# Patient Record
Sex: FEMALE | Race: WHITE | NOT HISPANIC OR LATINO | Employment: UNEMPLOYED | URBAN - NONMETROPOLITAN AREA
[De-identification: names, ages, dates, MRNs, and addresses within clinical notes are randomized per-mention and may not be internally consistent; named-entity substitution may affect disease eponyms.]

---

## 2017-01-24 ENCOUNTER — NON-PROVIDER VISIT (OUTPATIENT)
Dept: MEDICAL GROUP | Facility: CLINIC | Age: 62
End: 2017-01-24
Payer: MEDICAID

## 2017-01-24 DIAGNOSIS — Z02.1 PRE-EMPLOYMENT DRUG SCREENING: ICD-10-CM

## 2017-01-24 PROCEDURE — 86580 TB INTRADERMAL TEST: CPT | Performed by: NURSE PRACTITIONER

## 2017-01-26 ENCOUNTER — NON-PROVIDER VISIT (OUTPATIENT)
Dept: MEDICAL GROUP | Facility: CLINIC | Age: 62
End: 2017-01-26

## 2017-01-26 DIAGNOSIS — Z11.1 ENCOUNTER FOR PPD SKIN TEST READING: ICD-10-CM

## 2017-01-26 LAB — TB WHEAL 3D P 5 TU DIAM: NORMAL MM

## 2017-12-05 ENCOUNTER — NON-PROVIDER VISIT (OUTPATIENT)
Dept: MEDICAL GROUP | Facility: CLINIC | Age: 62
End: 2017-12-05

## 2017-12-05 DIAGNOSIS — Z11.1 PPD SCREENING TEST: ICD-10-CM

## 2017-12-05 PROCEDURE — 86580 TB INTRADERMAL TEST: CPT | Performed by: PHYSICIAN ASSISTANT

## 2017-12-07 ENCOUNTER — APPOINTMENT (OUTPATIENT)
Dept: MEDICAL GROUP | Facility: CLINIC | Age: 62
End: 2017-12-07

## 2017-12-07 LAB — TB WHEAL 3D P 5 TU DIAM: NORMAL MM

## 2018-12-27 ENCOUNTER — OFFICE VISIT (OUTPATIENT)
Dept: MEDICAL GROUP | Facility: CLINIC | Age: 63
End: 2018-12-27
Payer: MEDICAID

## 2018-12-27 ENCOUNTER — HOSPITAL ENCOUNTER (OUTPATIENT)
Facility: MEDICAL CENTER | Age: 63
End: 2018-12-27
Attending: FAMILY MEDICINE
Payer: MEDICAID

## 2018-12-27 VITALS
TEMPERATURE: 98.1 F | HEIGHT: 64 IN | HEART RATE: 68 BPM | SYSTOLIC BLOOD PRESSURE: 126 MMHG | DIASTOLIC BLOOD PRESSURE: 74 MMHG | BODY MASS INDEX: 49 KG/M2 | RESPIRATION RATE: 16 BRPM | OXYGEN SATURATION: 97 % | WEIGHT: 287 LBS

## 2018-12-27 DIAGNOSIS — H91.93 BILATERAL HEARING LOSS, UNSPECIFIED HEARING LOSS TYPE: ICD-10-CM

## 2018-12-27 DIAGNOSIS — E03.9 HYPOTHYROIDISM, UNSPECIFIED TYPE: ICD-10-CM

## 2018-12-27 DIAGNOSIS — E11.9 TYPE 2 DIABETES MELLITUS WITHOUT COMPLICATION, WITHOUT LONG-TERM CURRENT USE OF INSULIN (HCC): ICD-10-CM

## 2018-12-27 LAB
FORWARD REASON: SPWHY: NORMAL
FORWARDED TO LAB: SPWHR: NORMAL
SPECIMEN SENT: SPWT1: NORMAL

## 2018-12-27 PROCEDURE — 99213 OFFICE O/P EST LOW 20 MIN: CPT | Performed by: FAMILY MEDICINE

## 2018-12-27 ASSESSMENT — PATIENT HEALTH QUESTIONNAIRE - PHQ9: CLINICAL INTERPRETATION OF PHQ2 SCORE: 0

## 2018-12-27 ASSESSMENT — PAIN SCALES - GENERAL: PAINLEVEL: NO PAIN

## 2018-12-27 NOTE — ASSESSMENT & PLAN NOTE
Needs referral to Dr. Paez in [place]. Has appointment this afternoon. Lost right hearing aid, left doesn't work very well.

## 2018-12-27 NOTE — PROGRESS NOTES
Complaint: Referral to audiologist in [place]     Subjective:     Poonam Alonzo is a 63 y.o. female here today requesting a referral. Has not had insurance in 2 years. Requesting PE.     [place] (hard of hearing)  Needs referral to Dr. Paez in [place]. Has appointment this afternoon. Lost right hearing aid, left doesn't work very well.     States she has lost 10 lbs. Exercising and watching her diet. Taking otc thyroid supplement.     Current medicines (including changes today)  No current outpatient prescriptions on file.     No current facility-administered medications for this visit.      She  has a past medical history of Diabetes mellitus (HCC) (3/18/2015); [place] (hard of hearing); and Hypothyroid (6/26/2014). She also has no past medical history of Diabetes.    Health Maintenance: refuses all health maintenance today      Allergies: Codeine and Sulfa drugs    No current Epic-ordered outpatient prescriptions on file.     No current Epic-ordered facility-administered medications on file.        Past Medical History:   Diagnosis Date   • Diabetes mellitus (HCC) 3/18/2015   • [place] (hard of hearing)    • Hypothyroid 6/26/2014       Past Surgical History:   Procedure Laterality Date   • ABDOMINAL HYSTERECTOMY TOTAL  9/29/2014   • ABDOMINAL EXPLORATION      laparoscopic hysterectomy       Social History   Substance Use Topics   • Smoking status: Never Smoker   • Smokeless tobacco: Never Used   • Alcohol use No       Social History     Social History Narrative   • No narrative on file       No family history on file.      ROS Positive for difficulty hearing.  Patient denies any fever, chills, unintentional weight gain/loss, fatigue, stroke symptoms, dizziness, headache, nasal congestion, sore-throat, cough, heartburn, chest pain, difficulty breathing, abdominal discomfort, diarrhea/constipation, burning with urination or frequency, joint or back pain, skin rashes, depression or anxiety.       Objective:     Blood pressure  "126/74, pulse 68, temperature 36.7 °C (98.1 °F), temperature source Temporal, resp. rate 16, height 1.626 m (5' 4\"), weight (!) 130.2 kg (287 lb), SpO2 97 %, not currently breastfeeding. Body mass index is 49.26 kg/m².   Physical Exam:  Constitutional: Alert, no distress.  ENMT: Lips without lesions, good dentition, oropharynx clear. TM's pearly.  Neck: Trachea midline, no masses, no thyromegaly. No cervical or supraclavicular lymphadenopathy  Respiratory: Unlabored respiratory effort, lungs clear to auscultation, no wheezes, no ronchi.  Cardiovascular: Normal S1, S2, 2/6 systolic murmur, no extremity edema.  Psych: Alert and oriented x3, appropriate affect and mood.        Assessment and Plan:   The following treatment plan was discussed    1. Bilateral hearing loss, unspecified hearing loss type  Chronic problem.  - REFERRAL TO AUDIOLOGY    2. Labs ordered  - TSH WITH REFLEX TO FT4; Future  - CBC WITH DIFFERENTIAL; Future  - COMP METABOLIC PANEL; Future  - HEMOGLOBIN A1C; Future  - Lipid Profile; Future      Followup: Return in about 1 week (around 1/3/2019), or review labs, PE.    Please note that this dictation was created using voice recognition software. I have made every reasonable attempt to correct obvious errors, but I expect that there are errors of grammar and possibly content that I did not discover before finalizing the note.           "

## 2019-01-16 ENCOUNTER — OFFICE VISIT (OUTPATIENT)
Dept: MEDICAL GROUP | Facility: CLINIC | Age: 64
End: 2019-01-16
Payer: MEDICAID

## 2019-01-16 VITALS
HEART RATE: 60 BPM | BODY MASS INDEX: 47.29 KG/M2 | TEMPERATURE: 98.6 F | HEIGHT: 64 IN | OXYGEN SATURATION: 98 % | DIASTOLIC BLOOD PRESSURE: 66 MMHG | WEIGHT: 277 LBS | SYSTOLIC BLOOD PRESSURE: 112 MMHG

## 2019-01-16 DIAGNOSIS — L91.8 SKIN TAGS, MULTIPLE ACQUIRED: ICD-10-CM

## 2019-01-16 DIAGNOSIS — E03.9 HYPOTHYROIDISM, UNSPECIFIED TYPE: ICD-10-CM

## 2019-01-16 DIAGNOSIS — E78.2 MIXED HYPERLIPIDEMIA: ICD-10-CM

## 2019-01-16 PROCEDURE — 99214 OFFICE O/P EST MOD 30 MIN: CPT | Performed by: FAMILY MEDICINE

## 2019-01-16 RX ORDER — M-VIT,TX,IRON,MINS/CALC/FOLIC 27MG-0.4MG
1 TABLET ORAL DAILY
COMMUNITY
End: 2020-06-19

## 2019-01-16 ASSESSMENT — PATIENT HEALTH QUESTIONNAIRE - PHQ9: CLINICAL INTERPRETATION OF PHQ2 SCORE: 0

## 2019-01-16 NOTE — PROGRESS NOTES
Complaint: F/u labs     Subjective:     Poonam Alonzo is a 63 y.o. female here today for f/u labs.    Hypothyroid  Latest TSH 7.8, fT4 1.2. Taking natural thyroid extract.    Skin lesions  Has some growths on her back and in left arm pit.    Hyperlipidemia  Latest FLP: Tchol 244, HDL 35, , .     Would also like to know if she would be a good candidate for foster-mom. 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 In addition, adolescent would be coming from [place], has severe behavioral and mental issues. I told her that I did not think that would be a good idea, and that [place] did not have the appropriate mental health services for an adolescent with those issues. He would be too much of a burden for her at her age.    Current medicines (including changes today)  Current Outpatient Prescriptions   Medication Sig Dispense Refill   • THYROID PO Take  by mouth.     • therapeutic multivitamin-minerals (THERAGRAN-M) Tab Take 1 Tab by mouth every day.       No current facility-administered medications for this visit.      She  has a past medical history of Diabetes mellitus (HCC) (3/18/2015); [place] (hard of hearing); and Hypothyroid (6/26/2014). She also has no past medical history of Diabetes.    Health Maintenance:       Allergies: Codeine and Sulfa drugs    Current Outpatient Prescriptions Ordered in [facility]   Medication Sig Dispense Refill   • THYROID PO Take  by mouth.     • therapeutic multivitamin-minerals (THERAGRAN-M) Tab Take 1 Tab by mouth every day.       No current [facility]-ordered facility-administered medications on file.        Past Medical History:   Diagnosis Date   • Diabetes mellitus (HCC) 3/18/2015   • [place] (hard of hearing)    • Hypothyroid 6/26/2014       Past Surgical History:   Procedure Laterality Date   • ABDOMINAL HYSTERECTOMY TOTAL  9/29/2014   • ABDOMINAL EXPLORATION      laparoscopic hysterectomy       Social History  "  Substance Use Topics   • Smoking status: Never Smoker   • Smokeless tobacco: Never Used   • Alcohol use No       Social History     Social History Narrative   • No narrative on file       History reviewed. No pertinent family history.      ROS   Patient denies any fever, chills, unintentional weight gain/loss, fatigue, stroke symptoms, dizziness, headache, nasal congestion, sore-throat, cough, heartburn, chest pain, difficulty breathing, abdominal discomfort, diarrhea/constipation, burning with urination or frequency, joint or back pain, skin rashes, depression or anxiety.       Objective:     Blood pressure 112/66, pulse 60, temperature 37 °C (98.6 °F), temperature source Temporal, height 1.626 m (5' 4\"), weight (!) 125.6 kg (277 lb), SpO2 98 %, not currently breastfeeding. Body mass index is 47.55 kg/m².   Physical Exam:  Constitutional: Alert, no distress.  Skin: Warm, dry, good turgor, no rashes in visible areas. Large pendulant tabs left lower back, left axilla. Numerous seborrheic keratoses.          Assessment and Plan:   The following treatment plan was discussed    1. Mixed hyperlipidemia  10 yr ASCVD score: 5.4%. No need for aggressive lowering. Did recommend taking Omega 3 and red yeast rice supplements.    2. Skin tags, multiple acquired  Fibromas, return to have removed in [place].    3. Hypothyroidism, unspecified type  Recommend doubling up on extract. Recheck 3 months. May need to be convinced to take rx levothyroxine.      Followup: Return in about 3 months (around 4/16/2019).    Please note that this dictation was created using voice recognition software. I have made every reasonable attempt to correct obvious errors, but I expect that there are errors of grammar and possibly content that I did not discover before finalizing the note.           "

## 2019-04-24 ENCOUNTER — OFFICE VISIT (OUTPATIENT)
Dept: MEDICAL GROUP | Facility: CLINIC | Age: 64
End: 2019-04-24
Payer: MEDICARE

## 2019-04-24 VITALS
TEMPERATURE: 99 F | HEART RATE: 88 BPM | RESPIRATION RATE: 14 BRPM | SYSTOLIC BLOOD PRESSURE: 126 MMHG | HEIGHT: 65 IN | BODY MASS INDEX: 46.15 KG/M2 | WEIGHT: 277 LBS | OXYGEN SATURATION: 95 % | DIASTOLIC BLOOD PRESSURE: 86 MMHG

## 2019-04-24 DIAGNOSIS — Z12.11 SCREENING FOR COLON CANCER: ICD-10-CM

## 2019-04-24 DIAGNOSIS — E66.01 CLASS 3 SEVERE OBESITY DUE TO EXCESS CALORIES WITHOUT SERIOUS COMORBIDITY WITH BODY MASS INDEX (BMI) OF 45.0 TO 49.9 IN ADULT (HCC): ICD-10-CM

## 2019-04-24 DIAGNOSIS — Z12.39 BREAST CANCER SCREENING: ICD-10-CM

## 2019-04-24 DIAGNOSIS — L98.9 SKIN LESIONS: ICD-10-CM

## 2019-04-24 DIAGNOSIS — E78.2 MIXED HYPERLIPIDEMIA: ICD-10-CM

## 2019-04-24 DIAGNOSIS — L91.8 CUTANEOUS SKIN TAGS: ICD-10-CM

## 2019-04-24 DIAGNOSIS — E03.9 HYPOTHYROIDISM, UNSPECIFIED TYPE: ICD-10-CM

## 2019-04-24 PROCEDURE — 11200 RMVL SKIN TAGS UP TO&INC 15: CPT | Mod: GZ | Performed by: FAMILY MEDICINE

## 2019-04-24 PROCEDURE — 99214 OFFICE O/P EST MOD 30 MIN: CPT | Mod: 25 | Performed by: FAMILY MEDICINE

## 2019-04-24 NOTE — PROGRESS NOTES
Complaint: REmoval skin growths, recheck labs     Subjective:     Poonam Alonzo is a 64 y.o. female here today for f/u.    Cutaneous skin tags  Would like to have a tag removed from her left arm pit and one from her left lower back.    Hypothyroid  Has doubled up on her otc thyroid med. Explained to her that she won't be able to lose weight or normalize her lipids until thyroid under control.    Hyperlipidemia  Has only been taking red yeast rice and Omega 3,     No other concerns or complaints.    Current medicines (including changes today)  Current Outpatient Prescriptions   Medication Sig Dispense Refill   • THYROID PO Take  by mouth.     • therapeutic multivitamin-minerals (THERAGRAN-M) Tab Take 1 Tab by mouth every day.       No current facility-administered medications for this visit.      She  has a past medical history of Diabetes mellitus (HCC) (3/18/2015); [place] (hard of hearing); and Hypothyroid (6/26/2014). She also has no past medical history of Diabetes.    Health Maintenance: needs mammogram, FIT (refuses colonoscopy).      Allergies: Codeine and Sulfa drugs    Current Outpatient Prescriptions Ordered in [facility]   Medication Sig Dispense Refill   • THYROID PO Take  by mouth.     • therapeutic multivitamin-minerals (THERAGRAN-M) Tab Take 1 Tab by mouth every day.       No current [facility]-ordered facility-administered medications on file.        Past Medical History:   Diagnosis Date   • Diabetes mellitus (HCC) 3/18/2015   • [place] (hard of hearing)    • Hypothyroid 6/26/2014       Past Surgical History:   Procedure Laterality Date   • ABDOMINAL HYSTERECTOMY TOTAL  9/29/2014   • ABDOMINAL EXPLORATION      laparoscopic hysterectomy       Social History   Substance Use Topics   • Smoking status: Never Smoker   • Smokeless tobacco: Never Used   • Alcohol use No       Social History     Social History Narrative   • No narrative on file       History reviewed. No pertinent family history.      ROS   Patient denies  "any fever, chills, unintentional weight gain/loss, fatigue, stroke symptoms, dizziness, headache, nasal congestion, sore-throat, cough, heartburn, chest pain, difficulty breathing, abdominal discomfort, diarrhea/constipation, burning with urination or frequency, joint or back pain, skin rashes, depression or anxiety.       Objective:     /86 (BP Location: Left arm, Patient Position: Sitting, BP Cuff Size: Large adult)   Pulse 88   Temp 37.2 °C (99 °F)   Resp 14   Ht 1.651 m (5' 5\")   Wt (!) 125.6 kg (277 lb)   SpO2 95%  Body mass index is 46.1 kg/m².   Physical Exam:  Constitutional: Alert, no distress.  Skin: left axilla - broad-based brown tag, about 1 cm across. Left lower back - large narrow based flesh colored tag.      Assessment and Plan:   The following treatment plan was discussed    1. Hypothyroidism, unspecified type  If still abnl. Will try to convince her to go on [place] thyroid or Synthroid/Levoxyl.  - TSH WITH REFLEX TO FT4; Future    2. Mixed hyperlipidemia  Will notify with result.  - Lipid Profile; Future    3. Breast cancer screening  - MA-SCREENING MAMMO BILAT W/TOMOSYNTHESIS W/CAD; Future    4. Cutaneous skin tags/Skin lesion    - Consent for Surgery / Procedure    Procedures:   Left axilla - skin disinfected with Betadine. LA using infiltration of 1% xylocaine plus epi. Growth excised at base using sharp scissors. Gapping wound close with 3 sutures 3.0 Ethilon with good approximation. Antibiotic ointment plus band-aid.  Left lower back: skin disinfected with Betadine. LA using 1% xylocaine plus epi. Excision using sharp scissors. Hyfrection base. Antibiotic ointment plus band-aid.    Tissue visibly benign. Discarded.    Usual wound care instructions reviewed. Suture removal in 1 week.      5. Class 3 severe obesity due to excess calories without serious comorbidity with body mass index (BMI) of 45.0 to 49.9 in adult (HCC)  Nees to start walking on a daily basis.    6. Screening for " colon cancer  - OCCULT BLOOD FECES IMMUNOASSAY (FIT); Future      Followup: Return in about 1 week (around 5/1/2019).    Please note that this dictation was created using voice recognition software. I have made every reasonable attempt to correct obvious errors, but I expect that there are errors of grammar and possibly content that I did not discover before finalizing the note.

## 2019-04-24 NOTE — ASSESSMENT & PLAN NOTE
Has doubled up on her otc thyroid med. Explained to her that she won't be able to lose weight or normalize her lipids until thyroid under control.

## 2019-05-01 ENCOUNTER — HOSPITAL ENCOUNTER (OUTPATIENT)
Facility: MEDICAL CENTER | Age: 64
End: 2019-05-01
Attending: FAMILY MEDICINE
Payer: MEDICARE

## 2019-05-01 ENCOUNTER — NON-PROVIDER VISIT (OUTPATIENT)
Dept: MEDICAL GROUP | Facility: CLINIC | Age: 64
End: 2019-05-01
Payer: MEDICARE

## 2019-05-01 DIAGNOSIS — E03.9 HYPOTHYROIDISM, UNSPECIFIED TYPE: ICD-10-CM

## 2019-05-01 DIAGNOSIS — E11.9 TYPE 2 DIABETES MELLITUS WITHOUT COMPLICATION, WITHOUT LONG-TERM CURRENT USE OF INSULIN (HCC): ICD-10-CM

## 2019-05-01 LAB
CHOLEST SERPL-MCNC: 242 MG/DL (ref 100–199)
HDLC SERPL-MCNC: 33 MG/DL
LDLC SERPL CALC-MCNC: 163 MG/DL
T4 FREE SERPL-MCNC: 0.84 NG/DL (ref 0.53–1.43)
TRIGL SERPL-MCNC: 230 MG/DL (ref 0–149)
TSH SERPL DL<=0.005 MIU/L-ACNC: 9.57 UIU/ML (ref 0.38–5.33)

## 2019-05-01 PROCEDURE — 82274 ASSAY TEST FOR BLOOD FECAL: CPT

## 2019-05-01 PROCEDURE — 99000 SPECIMEN HANDLING OFFICE-LAB: CPT | Performed by: FAMILY MEDICINE

## 2019-05-01 PROCEDURE — 84439 ASSAY OF FREE THYROXINE: CPT

## 2019-05-01 PROCEDURE — 80061 LIPID PANEL: CPT

## 2019-05-01 PROCEDURE — 36415 COLL VENOUS BLD VENIPUNCTURE: CPT | Performed by: FAMILY MEDICINE

## 2019-05-01 PROCEDURE — 84443 ASSAY THYROID STIM HORMONE: CPT

## 2019-05-03 ENCOUNTER — APPOINTMENT (OUTPATIENT)
Dept: MEDICAL GROUP | Facility: CLINIC | Age: 64
End: 2019-05-03
Payer: MEDICARE

## 2019-05-05 DIAGNOSIS — Z12.11 SCREENING FOR COLON CANCER: ICD-10-CM

## 2019-05-05 LAB — AMBIGUOUS DTTM AMBI4: NORMAL

## 2019-05-06 LAB — HEMOCCULT STL QL IA: NEGATIVE

## 2019-05-17 ENCOUNTER — OFFICE VISIT (OUTPATIENT)
Dept: MEDICAL GROUP | Facility: CLINIC | Age: 64
End: 2019-05-17
Payer: MEDICARE

## 2019-05-17 VITALS
WEIGHT: 279 LBS | RESPIRATION RATE: 14 BRPM | SYSTOLIC BLOOD PRESSURE: 126 MMHG | HEART RATE: 80 BPM | HEIGHT: 65 IN | DIASTOLIC BLOOD PRESSURE: 84 MMHG | OXYGEN SATURATION: 97 % | TEMPERATURE: 98.3 F | BODY MASS INDEX: 46.48 KG/M2

## 2019-05-17 DIAGNOSIS — E03.9 HYPOTHYROIDISM, UNSPECIFIED TYPE: ICD-10-CM

## 2019-05-17 DIAGNOSIS — H57.9 EYE PROBLEM: ICD-10-CM

## 2019-05-17 PROBLEM — R73.03 PREDIABETES: Status: ACTIVE | Noted: 2019-05-17

## 2019-05-17 PROCEDURE — 99213 OFFICE O/P EST LOW 20 MIN: CPT | Performed by: FAMILY MEDICINE

## 2019-05-17 RX ORDER — CHLORAL HYDRATE 500 MG
1000 CAPSULE ORAL
COMMUNITY
End: 2021-03-11

## 2019-05-17 RX ORDER — LEVOTHYROXINE SODIUM 0.07 MG/1
75 TABLET ORAL
Qty: 30 TAB | Refills: 5 | Status: SHIPPED | OUTPATIENT
Start: 2019-05-17 | End: 2019-11-12 | Stop reason: SDUPTHER

## 2019-05-17 NOTE — PROGRESS NOTES
Complaint: Eye drainage     Subjective:     Poonam Alonzo is a 64 y.o. female here today for a new issue.    Eye problem  Since this past weekend has been experiencing excessive tearing from left eye as well as seeing a dot in her sight. Denies any injury to eye. No sxs in left. Denies getting something in eye.    Hypothyroid  TSH came back 9.5. I convinced patient that her otc thyroid supplements were not working for her.     No other concerns or complaints today.    Current medicines (including changes today)  Current Outpatient Prescriptions   Medication Sig Dispense Refill   • Omega-3 Fatty Acids (FISH OIL) 1000 MG Cap capsule Take 1,000 mg by mouth 3 times a day, with meals.     • Multiple Vitamins-Minerals (HAIR SKIN AND NAILS FORMULA) Tab Take  by mouth.     • levothyroxine (SYNTHROID) 75 MCG Tab Take 1 Tab by mouth Every morning on an empty stomach. 30 Tab 5   • THYROID PO Take  by mouth.     • therapeutic multivitamin-minerals (THERAGRAN-M) Tab Take 1 Tab by mouth every day.       No current facility-administered medications for this visit.      She  has a past medical history of Diabetes mellitus (HCC) (3/18/2015); [place] (hard of hearing); and Hypothyroid (6/26/2014). She also has no past medical history of Diabetes.    Health Maintenance:       Allergies: Codeine and Sulfa drugs    Current Outpatient Prescriptions Ordered in [facility]   Medication Sig Dispense Refill   • Omega-3 Fatty Acids (FISH OIL) 1000 MG Cap capsule Take 1,000 mg by mouth 3 times a day, with meals.     • Multiple Vitamins-Minerals (HAIR SKIN AND NAILS FORMULA) Tab Take  by mouth.     • levothyroxine (SYNTHROID) 75 MCG Tab Take 1 Tab by mouth Every morning on an empty stomach. 30 Tab 5   • THYROID PO Take  by mouth.     • therapeutic multivitamin-minerals (THERAGRAN-M) Tab Take 1 Tab by mouth every day.       No current [facility]-ordered facility-administered medications on file.        Past Medical History:   Diagnosis Date   • Diabetes  "mellitus (HCC) 3/18/2015   • [place] (hard of hearing)    • Hypothyroid 6/26/2014       Past Surgical History:   Procedure Laterality Date   • ABDOMINAL HYSTERECTOMY TOTAL  9/29/2014   • ABDOMINAL EXPLORATION      laparoscopic hysterectomy       Social History   Substance Use Topics   • Smoking status: Never Smoker   • Smokeless tobacco: Never Used   • Alcohol use No       Social History     Social History Narrative   • No narrative on file       History reviewed. No pertinent family history.      ROS Positive for drainage from left eye, black spot moving in front of eye  Patient denies any fever, chills, unintentional weight gain/loss, fatigue, stroke symptoms, dizziness, headache, nasal congestion, sore-throat, cough, heartburn, chest pain, difficulty breathing, abdominal discomfort, diarrhea/constipation, burning with urination or frequency, joint or back pain, skin rashes, depression or anxiety.       Objective:     /84 (BP Location: Left arm, Patient Position: Sitting, BP Cuff Size: Large adult)   Pulse 80   Temp 36.8 °C (98.3 °F) (Temporal)   Resp 14   Ht 1.638 m (5' 4.5\")   Wt (!) 126.6 kg (279 lb)   SpO2 97%  Body mass index is 47.15 kg/m².   Physical Exam:  Constitutional: Alert, no distress.  Skin: Warm, dry, good turgor. Would left axilla healing well.  Eye: Tearing OS, Equal, round and reactive, conjunctiva clear, corneae intact,  lids normal. Normal fundi.        Assessment and Plan:   The following treatment plan was discussed    1. Hypothyroidism, unspecified type  Will start her on Synthroid. Recheck 3 months.  - levothyroxine (SYNTHROID) 75 MCG Tab; Take 1 Tab by mouth Every morning on an empty stomach.  Dispense: 30 Tab; Refill: 5    2. Eye problem  - REFERRAL TO OPHTHALMOLOGY- Patient given tel # Dr. [facility] practice in .      Followup: Return if symptoms worsen or fail to improve.    Please note that this dictation was created using voice recognition software. I have made every " reasonable attempt to correct obvious errors, but I expect that there are errors of grammar and possibly content that I did not discover before finalizing the note.

## 2019-05-17 NOTE — ASSESSMENT & PLAN NOTE
Since this past weekend has been experiencing excessive tearing from left eye as well as seeing a dot in her sight. Denies any injury to eye. No sxs in left. Denies getting something in eye.

## 2020-06-05 ENCOUNTER — HOSPITAL ENCOUNTER (OUTPATIENT)
Facility: MEDICAL CENTER | Age: 65
End: 2020-06-05
Attending: FAMILY MEDICINE
Payer: MEDICARE

## 2020-06-05 ENCOUNTER — OFFICE VISIT (OUTPATIENT)
Dept: MEDICAL GROUP | Facility: CLINIC | Age: 65
End: 2020-06-05
Payer: MEDICARE

## 2020-06-05 VITALS
RESPIRATION RATE: 16 BRPM | SYSTOLIC BLOOD PRESSURE: 124 MMHG | DIASTOLIC BLOOD PRESSURE: 72 MMHG | TEMPERATURE: 99.1 F | WEIGHT: 285 LBS | HEART RATE: 77 BPM | BODY MASS INDEX: 47.48 KG/M2 | OXYGEN SATURATION: 93 % | HEIGHT: 65 IN

## 2020-06-05 DIAGNOSIS — E78.2 MIXED HYPERLIPIDEMIA: ICD-10-CM

## 2020-06-05 DIAGNOSIS — Z12.12 SCREENING FOR COLORECTAL CANCER: ICD-10-CM

## 2020-06-05 DIAGNOSIS — R21 RASH: Chronic | ICD-10-CM

## 2020-06-05 DIAGNOSIS — E03.9 HYPOTHYROIDISM, UNSPECIFIED TYPE: ICD-10-CM

## 2020-06-05 DIAGNOSIS — Z12.11 SCREENING FOR COLORECTAL CANCER: ICD-10-CM

## 2020-06-05 DIAGNOSIS — J30.2 SEASONAL ALLERGIES: ICD-10-CM

## 2020-06-05 DIAGNOSIS — E66.01 CLASS 3 SEVERE OBESITY DUE TO EXCESS CALORIES WITHOUT SERIOUS COMORBIDITY WITH BODY MASS INDEX (BMI) OF 45.0 TO 49.9 IN ADULT (HCC): ICD-10-CM

## 2020-06-05 DIAGNOSIS — R73.03 PREDIABETES: ICD-10-CM

## 2020-06-05 PROBLEM — L91.8 CUTANEOUS SKIN TAGS: Status: RESOLVED | Noted: 2019-04-24 | Resolved: 2020-06-05

## 2020-06-05 LAB
ALBUMIN SERPL BCP-MCNC: 3.7 G/DL (ref 3.2–4.9)
ALBUMIN/GLOB SERPL: 1 G/DL
ALP SERPL-CCNC: 80 U/L (ref 30–99)
ALT SERPL-CCNC: 19 U/L (ref 2–50)
AMBIGUOUS DTTM AMBI4: NORMAL
ANION GAP SERPL CALC-SCNC: 15 MMOL/L (ref 7–16)
AST SERPL-CCNC: 19 U/L (ref 12–45)
BASOPHILS # BLD AUTO: 0.8 % (ref 0–1.8)
BASOPHILS # BLD: 0.07 K/UL (ref 0–0.12)
BILIRUB SERPL-MCNC: 0.2 MG/DL (ref 0.1–1.5)
BUN SERPL-MCNC: 19 MG/DL (ref 8–22)
CALCIUM SERPL-MCNC: 9.2 MG/DL (ref 8.5–10.5)
CHLORIDE SERPL-SCNC: 103 MMOL/L (ref 96–112)
CHOLEST SERPL-MCNC: 230 MG/DL (ref 100–199)
CO2 SERPL-SCNC: 20 MMOL/L (ref 20–33)
CREAT SERPL-MCNC: 0.8 MG/DL (ref 0.5–1.4)
EOSINOPHIL # BLD AUTO: 0.33 K/UL (ref 0–0.51)
EOSINOPHIL NFR BLD: 3.8 % (ref 0–6.9)
ERYTHROCYTE [DISTWIDTH] IN BLOOD BY AUTOMATED COUNT: 48.3 FL (ref 35.9–50)
EST. AVERAGE GLUCOSE BLD GHB EST-MCNC: 143 MG/DL
GLOBULIN SER CALC-MCNC: 3.6 G/DL (ref 1.9–3.5)
GLUCOSE SERPL-MCNC: 216 MG/DL (ref 65–99)
HBA1C MFR BLD: 6.6 % (ref 0–5.6)
HCT VFR BLD AUTO: 47 % (ref 37–47)
HDLC SERPL-MCNC: 31 MG/DL
HGB BLD-MCNC: 14.8 G/DL (ref 12–16)
IMM GRANULOCYTES # BLD AUTO: 0.02 K/UL (ref 0–0.11)
IMM GRANULOCYTES NFR BLD AUTO: 0.2 % (ref 0–0.9)
LDLC SERPL CALC-MCNC: 140 MG/DL
LYMPHOCYTES # BLD AUTO: 2.71 K/UL (ref 1–4.8)
LYMPHOCYTES NFR BLD: 30.9 % (ref 22–41)
MCH RBC QN AUTO: 27.7 PG (ref 27–33)
MCHC RBC AUTO-ENTMCNC: 31.5 G/DL (ref 33.6–35)
MCV RBC AUTO: 87.9 FL (ref 81.4–97.8)
MONOCYTES # BLD AUTO: 0.56 K/UL (ref 0–0.85)
MONOCYTES NFR BLD AUTO: 6.4 % (ref 0–13.4)
NEUTROPHILS # BLD AUTO: 5.09 K/UL (ref 2–7.15)
NEUTROPHILS NFR BLD: 57.9 % (ref 44–72)
NRBC # BLD AUTO: 0 K/UL
NRBC BLD-RTO: 0 /100 WBC
PLATELET # BLD AUTO: 256 K/UL (ref 164–446)
PMV BLD AUTO: 12.4 FL (ref 9–12.9)
POTASSIUM SERPL-SCNC: 4.1 MMOL/L (ref 3.6–5.5)
PROT SERPL-MCNC: 7.3 G/DL (ref 6–8.2)
RBC # BLD AUTO: 5.35 M/UL (ref 4.2–5.4)
SODIUM SERPL-SCNC: 138 MMOL/L (ref 135–145)
TRIGL SERPL-MCNC: 294 MG/DL (ref 0–149)
TSH SERPL DL<=0.005 MIU/L-ACNC: 3.84 UIU/ML (ref 0.38–5.33)
WBC # BLD AUTO: 8.8 K/UL (ref 4.8–10.8)

## 2020-06-05 PROCEDURE — 80053 COMPREHEN METABOLIC PANEL: CPT

## 2020-06-05 PROCEDURE — 99214 OFFICE O/P EST MOD 30 MIN: CPT | Performed by: FAMILY MEDICINE

## 2020-06-05 PROCEDURE — 85025 COMPLETE CBC W/AUTO DIFF WBC: CPT

## 2020-06-05 PROCEDURE — 84443 ASSAY THYROID STIM HORMONE: CPT

## 2020-06-05 PROCEDURE — 83036 HEMOGLOBIN GLYCOSYLATED A1C: CPT

## 2020-06-05 PROCEDURE — 80061 LIPID PANEL: CPT

## 2020-06-05 ASSESSMENT — PATIENT HEALTH QUESTIONNAIRE - PHQ9: CLINICAL INTERPRETATION OF PHQ2 SCORE: 0

## 2020-06-05 NOTE — ASSESSMENT & PLAN NOTE
Develops rash under breasts and under abdominal fold in hot months, self-treats with honey-based liquid.

## 2020-06-05 NOTE — ASSESSMENT & PLAN NOTE
Currently managed with Synthroid 75mcg/day, last TSH over 1 year ago! Some weight gain, no depression or other symptom of hypothyroidism.

## 2020-06-05 NOTE — PROGRESS NOTES
Complaint: refill thyroid medicine     Subjective:     Poonam Alonzo is a 65 y.o. female here today for f/u. Has not been seen in over 1 year. Fell this past March, tripped over curb, landed on right elbow, which she broke, ban=ged up both knees, recovered well.    Hypothyroid  Currently managed with Synthroid 75mcg/day, last TSH over 1 year ago! Some weight gain, no depression or other symptom of hypothyroidism.    Seasonal allergies  Takes otc allergy pil with sx relief.    Prediabetes  Last A1c 6.2%, in 2016!.    Hyperlipidemia  Last FLP in 5/2019, not treated.    Rash  Develops rash under breasts and under abdominal fold in hot months, self-treats with honey-based liquid.     No other cone    Current medicines (including changes today)  Current Outpatient Medications   Medication Sig Dispense Refill   • levothyroxine (SYNTHROID) 75 MCG Tab TAKE ONE TABLET BY MOUTH EVERY MORNING ON AN EMPTY STOMACH. 30 Tab 5   • Omega-3 Fatty Acids (FISH OIL) 1000 MG Cap capsule Take 1,000 mg by mouth 3 times a day, with meals.     • Multiple Vitamins-Minerals (HAIR SKIN AND NAILS FORMULA) Tab Take  by mouth.     • THYROID PO Take  by mouth.     • therapeutic multivitamin-minerals (THERAGRAN-M) Tab Take 1 Tab by mouth every day.       No current facility-administered medications for this visit.      She  has a past medical history of Diabetes mellitus (HCC) (3/18/2015), [place] (hard of hearing), and Hypothyroid (6/26/2014). She also has no past medical history of Diabetes.    Health Maintenance:       Allergies: Codeine and Sulfa drugs    Current Outpatient Medications Ordered in [facility]   Medication Sig Dispense Refill   • levothyroxine (SYNTHROID) 75 MCG Tab TAKE ONE TABLET BY MOUTH EVERY MORNING ON AN EMPTY STOMACH. 30 Tab 5   • Omega-3 Fatty Acids (FISH OIL) 1000 MG Cap capsule Take 1,000 mg by mouth 3 times a day, with meals.     • Multiple Vitamins-Minerals (HAIR SKIN AND NAILS FORMULA) Tab Take  by mouth.     • THYROID PO  "Take  by mouth.     • therapeutic multivitamin-minerals (THERAGRAN-M) Tab Take 1 Tab by mouth every day.       No current [facility]-ordered facility-administered medications on file.        Past Medical History:   Diagnosis Date   • Diabetes mellitus (HCC) 3/18/2015   • [place] (hard of hearing)    • Hypothyroid 6/26/2014       Past Surgical History:   Procedure Laterality Date   • ABDOMINAL HYSTERECTOMY TOTAL  9/29/2014   • ABDOMINAL EXPLORATION      laparoscopic hysterectomy       Social History     Tobacco Use   • Smoking status: Never Smoker   • Smokeless tobacco: Never Used   Substance Use Topics   • Alcohol use: No     Alcohol/week: 0.0 oz   • Drug use: No       Social History     Social History Narrative   • Not on file       History reviewed. No pertinent family history.      ROS Positive for difficulty hearing, rashes under breasts and abdominal fold, inability to completely extend right elbow.  Patient denies any fever, chills, unintentional weight gain/loss, fatigue, stroke symptoms, dizziness, headache, nasal congestion, sore-throat, cough, heartburn, chest pain, difficulty breathing, abdominal discomfort, diarrhea/constipation, burning with urination or frequency, joint or back pain, depression or anxiety.       Objective:     /72   Pulse 77   Temp 37.3 °C (99.1 °F) (Temporal)   Resp 16   Ht 1.651 m (5' 5\")   Wt (!) 129.3 kg (285 lb)   SpO2 93%  Body mass index is 47.43 kg/m².   Physical Exam:  Constitutional: Alert, no distress. Morbidly obese, steady gait.  Skin: Warm, dry, good turgor, no rashes in visible areas.  Eye: Equal, round and reactive, conjunctiva clear, lids normal.  ENMT: Lips without lesions, good dentition, oropharynx clear. Ear canals clear.  Neck: Trachea midline, no masses, no thyromegaly. No cervical or supraclavicular lymphadenopathy  Respiratory: Unlabored respiratory effort, lungs clear to auscultation, no wheezes, no ronchi.  Cardiovascular: Normal S1, S2, no murmur, no " extremity edema.  Psych: Alert and oriented x3, appropriate affect and mood.        Assessment and Plan:   The following treatment plan was discussed    1. Hypothyroidism, unspecified type  Will notify with result, goal TSH between 1 and 2.  - TSH WITH REFLEX TO FT4; Future    2. Prediabetes  Will add metformin if over 6.  - CBC WITH DIFFERENTIAL; Future  - Comp Metabolic Panel; Future  - HEMOGLOBIN A1C; Future    3. Mixed hyperlipidemia  Will add statin if still abnormal.  - Lipid Profile; Future    4. Seasonal allergies  Continue otc med prn.    5. Screening for colorectal cancer  - COLOGUARD (FIT DNA)    6. Class 3 severe obesity due to excess calories without serious comorbidity with body mass index (BMI) of 45.0 to 49.9 in adult (HCC)  - Patient identified as having weight management issue.  Appropriate orders and counseling given.    7. Rash  Reviewed proper skin care, use of talcum powder to keep areas dry.      Followup: Return in about 6 months (around 12/5/2020) for with new pcp.    Please note that this dictation was created using voice recognition software. I have made every reasonable attempt to correct obvious errors, but I expect that there are errors of grammar and possibly content that I did not discover before finalizing the note.

## 2020-06-12 ENCOUNTER — TELEPHONE (OUTPATIENT)
Dept: MEDICAL GROUP | Facility: CLINIC | Age: 65
End: 2020-06-12

## 2020-06-12 NOTE — TELEPHONE ENCOUNTER
Phone Number Called: [phone] (home)       Call outcome: Spoke to patient regarding message below.    Message: Made an apt with patient to come into office

## 2020-06-12 NOTE — TELEPHONE ENCOUNTER
----- Message from Kevin Sosa M.D. sent at 6/11/2020  3:33 PM PDT -----  Please notify patient with lab test result.    Have patient make an appointment to discuss.

## 2020-06-19 ENCOUNTER — OFFICE VISIT (OUTPATIENT)
Dept: MEDICAL GROUP | Facility: CLINIC | Age: 65
End: 2020-06-19
Payer: MEDICARE

## 2020-06-19 VITALS
HEART RATE: 74 BPM | SYSTOLIC BLOOD PRESSURE: 142 MMHG | BODY MASS INDEX: 47.92 KG/M2 | TEMPERATURE: 99.5 F | OXYGEN SATURATION: 96 % | WEIGHT: 287.6 LBS | RESPIRATION RATE: 16 BRPM | DIASTOLIC BLOOD PRESSURE: 80 MMHG | HEIGHT: 65 IN

## 2020-06-19 DIAGNOSIS — E03.9 HYPOTHYROIDISM, UNSPECIFIED TYPE: ICD-10-CM

## 2020-06-19 DIAGNOSIS — J30.2 SEASONAL ALLERGIC RHINITIS, UNSPECIFIED TRIGGER: ICD-10-CM

## 2020-06-19 DIAGNOSIS — E11.9 TYPE 2 DIABETES MELLITUS WITHOUT COMPLICATION, WITHOUT LONG-TERM CURRENT USE OF INSULIN (HCC): ICD-10-CM

## 2020-06-19 DIAGNOSIS — S50.811A ABRASION OF RIGHT FOREARM, INITIAL ENCOUNTER: ICD-10-CM

## 2020-06-19 DIAGNOSIS — E78.2 MIXED HYPERLIPIDEMIA: ICD-10-CM

## 2020-06-19 PROBLEM — R73.03 PREDIABETES: Status: RESOLVED | Noted: 2019-05-17 | Resolved: 2020-06-19

## 2020-06-19 PROCEDURE — 99214 OFFICE O/P EST MOD 30 MIN: CPT | Performed by: FAMILY MEDICINE

## 2020-06-19 RX ORDER — LEVOTHYROXINE SODIUM 0.1 MG/1
TABLET ORAL
Qty: 30 TAB | Refills: 5 | Status: SHIPPED | OUTPATIENT
Start: 2020-06-19 | End: 2020-12-28

## 2020-06-19 RX ORDER — FLUTICASONE PROPIONATE 50 MCG
1 SPRAY, SUSPENSION (ML) NASAL DAILY
Qty: 16 G | Refills: 2 | Status: SHIPPED | OUTPATIENT
Start: 2020-06-19 | End: 2021-03-11

## 2020-06-19 RX ORDER — ATORVASTATIN CALCIUM 10 MG/1
10 TABLET, FILM COATED ORAL DAILY
Qty: 30 TAB | Refills: 2 | Status: SHIPPED | OUTPATIENT
Start: 2020-06-19 | End: 2021-03-11

## 2020-06-19 ASSESSMENT — FIBROSIS 4 INDEX: FIB4 SCORE: 1.12

## 2020-06-19 NOTE — ASSESSMENT & PLAN NOTE
Recent TSH:    Results for GERDA CASTELLANOS (MRN [id number]) as of 6/19/2020 11:07   Ref. Range 6/5/2020 20:07   TSH Latest Ref Range: 0.380 - 5.330 uIU/mL 3.840     Currently on Synthroid 75 mcg/day. Feels like she has no energy.

## 2020-06-19 NOTE — ASSESSMENT & PLAN NOTE
Recent A1c:    Results for GERDA CASTELLANOS (MRN [id number]) as of 6/19/2020 11:07   Ref. Range 6/5/2020 20:07   Glycohemoglobin Latest Ref Range: 0.0 - 5.6 % 6.6 (H)

## 2020-06-19 NOTE — PROGRESS NOTES
Complaint: Review lab bhavesh results.     Subjective:     Gerda Castellanos is a 65 y.o. female here today for f/u. I reviewed her recent lab test results with her.    Seasonal allergic rhinitis  Complains of decreased hearing due to nasal congestion. Taking otc antihistamine only.    Hypothyroid  Recent TSH:    Results for GERDA CASTELLANOS (MRN [id number]) as of 6/19/2020 11:07   Ref. Range 6/5/2020 20:07   TSH Latest Ref Range: 0.380 - 5.330 uIU/mL 3.840     Currently on Synthroid 75 mcg/day. Feels like she has no energy.    Hyperlipidemia  Recent FLP:    Results for GERDA CASTELLANOS (MRN [id number]) as of 6/19/2020 11:07   Ref. Range 6/5/2020 20:07   Cholesterol,Tot Latest Ref Range: 100 - 199 mg/dL 230 (H)   Triglycerides Latest Ref Range: 0 - 149 mg/dL 294 (H)   HDL Latest Ref Range: >=40 mg/dL 31 (A)   LDL Latest Ref Range: <100 mg/dL 140 (H)         Diabetes mellitus (HCC)  Recent A1c:    Results for GERDA CASTELLANOS (MRN [id number]) as of 6/19/2020 11:07   Ref. Range 6/5/2020 20:07   Glycohemoglobin Latest Ref Range: 0.0 - 5.6 % 6.6 (H)       Abrasion of right forearm  Got scratched by her dog, self-treating.     No other concerns or complaints today.    Current medicines (including changes today)  Current Outpatient Medications   Medication Sig Dispense Refill   • levothyroxine (SYNTHROID) 100 MCG Tab TAKE ONE TABLET BY MOUTH EVERY MORNING ON AN EMPTY STOMACH. 30 Tab 5   • metFORMIN (GLUCOPHAGE) 500 MG Tab Take 1 Tab by mouth 2 times a day, with meals. 60 Tab 2   • fluticasone (FLONASE) 50 MCG/ACT nasal spray Spray 1 Spray in nose every day. 16 g 2   • atorvastatin (LIPITOR) 10 MG Tab Take 1 Tab by mouth every day. 30 Tab 2   • Omega-3 Fatty Acids (FISH OIL) 1000 MG Cap capsule Take 1,000 mg by mouth 3 times a day, with meals.     • Multiple Vitamins-Minerals (HAIR SKIN AND NAILS FORMULA) Tab Take  by mouth.     • THYROID PO Take  by mouth.       No current facility-administered medications for this visit.       She  has a past medical history of Diabetes mellitus (HCC) (3/18/2015), [place] (hard of hearing), and Hypothyroid (6/26/2014). She also has no past medical history of Diabetes.    Health Maintenance: needs to be addressed at f/u with new provider      Allergies: Codeine and Sulfa drugs    Current Outpatient Medications Ordered in Epic   Medication Sig Dispense Refill   • levothyroxine (SYNTHROID) 100 MCG Tab TAKE ONE TABLET BY MOUTH EVERY MORNING ON AN EMPTY STOMACH. 30 Tab 5   • metFORMIN (GLUCOPHAGE) 500 MG Tab Take 1 Tab by mouth 2 times a day, with meals. 60 Tab 2   • fluticasone (FLONASE) 50 MCG/ACT nasal spray Spray 1 Spray in nose every day. 16 g 2   • atorvastatin (LIPITOR) 10 MG Tab Take 1 Tab by mouth every day. 30 Tab 2   • Omega-3 Fatty Acids (FISH OIL) 1000 MG Cap capsule Take 1,000 mg by mouth 3 times a day, with meals.     • Multiple Vitamins-Minerals (HAIR SKIN AND NAILS FORMULA) Tab Take  by mouth.     • THYROID PO Take  by mouth.       No current Epic-ordered facility-administered medications on file.        Past Medical History:   Diagnosis Date   • Diabetes mellitus (HCC) 3/18/2015   • [place] (hard of hearing)    • Hypothyroid 6/26/2014       Past Surgical History:   Procedure Laterality Date   • ABDOMINAL HYSTERECTOMY TOTAL  9/29/2014   • ABDOMINAL EXPLORATION      laparoscopic hysterectomy       Social History     Tobacco Use   • Smoking status: Never Smoker   • Smokeless tobacco: Never Used   Substance Use Topics   • Alcohol use: No     Alcohol/week: 0.0 oz   • Drug use: No       Social History     Social History Narrative   • Not on file       History reviewed. No pertinent family history.      ROS Positive for lack of energy, scrape on right forearm.  Patient denies any fever, chills, unintentional weight gain/loss, fatigue, stroke symptoms, dizziness, headache, nasal congestion, sore-throat, cough, heartburn, chest pain, difficulty breathing, abdominal discomfort, diarrhea/constipation,  "burning with urination or frequency, joint or back pain, skin rashes, depression or anxiety.       Objective:     /80 (BP Location: Left arm, Patient Position: Sitting, BP Cuff Size: Large adult)   Pulse 74   Temp 37.5 °C (99.5 °F) (Temporal)   Resp 16   Ht 1.651 m (5' 5\")   Wt (!) 130.5 kg (287 lb 9.6 oz)   SpO2 96%  Body mass index is 47.86 kg/m².   Physical Exam:  Constitutional: Alert, no distress.  Skin: Warm, dry, good turgor, no rashes in visible areas.1 cm oblong crap, scabbed over, right forearm, no sign infection.        Assessment and Plan:   The following treatment plan was discussed    1. Hypothyroidism, unspecified type  Uncontrolled. Will increase her Synthroid to 100 mcg/day. Recheck prior to f/u.  - levothyroxine (SYNTHROID) 100 MCG Tab; TAKE ONE TABLET BY MOUTH EVERY MORNING ON AN EMPTY STOMACH.  Dispense: 30 Tab; Refill: 5  - TSH WITH REFLEX TO FT4; Future    2. Type 2 diabetes mellitus without complication, without long-term current use of insulin (HCC)  New diagnosis. Labs prior to f/u.  - metFORMIN (GLUCOPHAGE) 500 MG Tab; Take 1 Tab by mouth 2 times a day, with meals.  Dispense: 60 Tab; Refill: 2  - HEMOGLOBIN A1C; Future  - MICROALBUMIN CREAT RATIO URINE; Future  - Comp Metabolic Panel; Future    3. Seasonal allergic rhinitis, unspecified trigger  Uncontrolled. Continue otc allergy tab.  - fluticasone (FLONASE) 50 MCG/ACT nasal spray; Spray 1 Spray in nose every day.  Dispense: 16 g; Refill: 2    4. Mixed hyperlipidemia  New problem. Start statin.  - atorvastatin (LIPITOR) 10 MG Tab; Take 1 Tab by mouth every day.  Dispense: 30 Tab; Refill: 2  - Lipid Profile; Future    5. Abrasion of right forearm, initial encounter  Healing nicely, no action.    Will get OPO thru [facility] to complete w/u for fatigue.    Followup: Return in about 3 months (around 9/19/2020), or with new provider.    Please note that this dictation was created using voice recognition software. I have made every " reasonable attempt to correct obvious errors, but I expect that there are errors of grammar and possibly content that I did not discover before finalizing the note.

## 2020-09-24 ENCOUNTER — OFFICE VISIT (OUTPATIENT)
Dept: URGENT CARE | Facility: PHYSICIAN GROUP | Age: 65
End: 2020-09-24
Payer: MEDICARE

## 2020-09-24 ENCOUNTER — NURSE TRIAGE (OUTPATIENT)
Dept: HEALTH INFORMATION MANAGEMENT | Facility: OTHER | Age: 65
End: 2020-09-24

## 2020-09-24 ENCOUNTER — TELEPHONE (OUTPATIENT)
Dept: URGENT CARE | Facility: PHYSICIAN GROUP | Age: 65
End: 2020-09-24

## 2020-09-24 ENCOUNTER — APPOINTMENT (OUTPATIENT)
Dept: RADIOLOGY | Facility: IMAGING CENTER | Age: 65
End: 2020-09-24
Attending: FAMILY MEDICINE
Payer: MEDICARE

## 2020-09-24 ENCOUNTER — HOSPITAL ENCOUNTER (OUTPATIENT)
Facility: MEDICAL CENTER | Age: 65
End: 2020-09-24
Attending: FAMILY MEDICINE
Payer: MEDICARE

## 2020-09-24 VITALS
BODY MASS INDEX: 47.25 KG/M2 | HEIGHT: 65 IN | OXYGEN SATURATION: 94 % | TEMPERATURE: 98.3 F | RESPIRATION RATE: 16 BRPM | HEART RATE: 84 BPM | DIASTOLIC BLOOD PRESSURE: 82 MMHG | WEIGHT: 283.6 LBS | SYSTOLIC BLOOD PRESSURE: 132 MMHG

## 2020-09-24 DIAGNOSIS — Z20.822 SUSPECTED COVID-19 VIRUS INFECTION: ICD-10-CM

## 2020-09-24 DIAGNOSIS — J22 ACUTE RESPIRATORY INFECTION: ICD-10-CM

## 2020-09-24 DIAGNOSIS — J98.01 BRONCHOSPASM: ICD-10-CM

## 2020-09-24 LAB
COVID ORDER STATUS COVID19: NORMAL
SARS-COV-2 RNA RESP QL NAA+PROBE: NOTDETECTED
SPECIMEN SOURCE: NORMAL

## 2020-09-24 PROCEDURE — 71046 X-RAY EXAM CHEST 2 VIEWS: CPT | Mod: TC,FY | Performed by: FAMILY MEDICINE

## 2020-09-24 PROCEDURE — U0003 INFECTIOUS AGENT DETECTION BY NUCLEIC ACID (DNA OR RNA); SEVERE ACUTE RESPIRATORY SYNDROME CORONAVIRUS 2 (SARS-COV-2) (CORONAVIRUS DISEASE [COVID-19]), AMPLIFIED PROBE TECHNIQUE, MAKING USE OF HIGH THROUGHPUT TECHNOLOGIES AS DESCRIBED BY CMS-2020-01-R: HCPCS

## 2020-09-24 PROCEDURE — 99214 OFFICE O/P EST MOD 30 MIN: CPT | Performed by: FAMILY MEDICINE

## 2020-09-24 RX ORDER — ALBUTEROL SULFATE 90 UG/1
1-2 AEROSOL, METERED RESPIRATORY (INHALATION) EVERY 4 HOURS PRN
Qty: 1 EACH | Refills: 1 | Status: SHIPPED | OUTPATIENT
Start: 2020-09-24 | End: 2021-03-11

## 2020-09-24 RX ORDER — BENZONATATE 100 MG/1
100 CAPSULE ORAL 3 TIMES DAILY PRN
Qty: 30 CAP | Refills: 0 | Status: SHIPPED | OUTPATIENT
Start: 2020-09-24 | End: 2021-03-11

## 2020-09-24 RX ORDER — DEXTROMETHORPHAN HBR AND GUAIFENESIN 5; 100 MG/5ML; MG/5ML
10 LIQUID ORAL EVERY 6 HOURS PRN
Qty: 237 ML | Refills: 0 | Status: SHIPPED | OUTPATIENT
Start: 2020-09-24 | End: 2020-10-01

## 2020-09-24 ASSESSMENT — ENCOUNTER SYMPTOMS
COUGH: 1
FEVER: 0
NAUSEA: 0
SHORTNESS OF BREATH: 1
EYE REDNESS: 0
MYALGIAS: 0
DIZZINESS: 0
VOMITING: 0
CHILLS: 0
SORE THROAT: 0

## 2020-09-24 ASSESSMENT — FIBROSIS 4 INDEX: FIB4 SCORE: 1.12

## 2020-09-24 NOTE — TELEPHONE ENCOUNTER
1. Caller Name: Poonam Alonzo                 Call Back Number: [phone]  [facility] PCP or Specialty Provider: Yes         2.  In the last two weeks, has the patient had any new or worsening symptoms (not explained by alternative diagnosis)? Yes, the patient reports the following COVID-19 consistent symptoms: cough and congestion or runny nose, productive    3.  Does patient have any comoribidities? DM    4.  Has the patient traveled in the last 14 days OR had any known contact with someone who is suspected or confirmed to have COVID-19?  Yes, out of area, no exposure.     5. POTENTIAL PUI (MODERATE):  Directed to [facility]     Note routed to [facility] Provider: FYI only.
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
## 2020-09-24 NOTE — TELEPHONE ENCOUNTER
Pt called and would like for the rx to be changed as the rx that was prescribed is not covered by her insurance.

## 2020-09-24 NOTE — PATIENT INSTRUCTIONS
INSTRUCTIONS FOR COVID-19 OR ANY OTHER INFECTIOUS RESPIRATORY ILLNESSES    The Centers for Disease Control and Prevention (CDC) states that early indications for COVID-19 include cough, shortness of breath, difficulty breathing, or at least two of the following symptoms: chills, shaking with chills, muscle pain, headache, sore throat, and loss of taste or smell. Symptoms can range from mild to severe and may appear up to two weeks after exposure to the virus.    The practice of self-isolation and quarantine helps protect the public and your family by  preventing exposure to people who have or may have a contagious disease. Please follow the prevention steps below as based on CDC guidelines:    WHEN TO STOP ISOLATION: Persons with COVID-19 or any other infectious respiratory illness who have symptoms and were advised to care for themselves at home may discontinue home isolation under the following conditions:  · At least 24 hours have passed since recovery defined as resolution of fever without the use of fever-reducing medications; AND,  · Improvement in respiratory symptoms (e.g., cough, shortness of breath); AND,  · At least 10 days have passed since symptoms first appeared and have had no subsequent illness.    MONITOR YOUR SYMPTOMS: If your illness is worsening, seek prompt medical attention. If you have a medical emergency and need to call 911, notify the dispatch personnel that you have, or are being evaluated for confirmed or suspected COVID-19 or another infectious respiratory illness. Wear a facemask if possible.    ACTIVITY RESTRICTION: restrict activities outside your home, except for getting medical care. Do not go to work, school, or public areas. Avoid using public transportation, ride-sharing, or taxis.    SCHEDULED MEDICAL APPOINTMENTS: Notify your provider that you have, or are being evaluated for, confirmed or suspected COVID-19 or another infectious respiratory. This will help the healthcare  provider’s office safely take care of you and keep other people from getting exposed or infected.    FACEMASKS, when to wear: Anytime you are away from your home or around other people or pets. If you are unable to wear one, maintain a minimum of 6 feet distancing from others.    LIVING ENVIRONMENT: Stay in a separate room from other people and pets. If possible, use a separate bathroom, have someone else care for your pets and avoid sharing household items. Any items used should be washed thoroughly with soap and water. Clean all “high-touch” surfaces every day. Use a household cleaning spray or wipe, according to the label instructions. High touch surfaces include (but are not limited to) counters, tabletops, doorknobs, bathroom fixtures, toilets, phones, keyboards, tablets, and bedside tables.     HAND WASHING: Frequently wash hands with soap and water for at least 20 seconds,  especially after blowing your nose, coughing, or sneezing; going to the bathroom; before and after interacting with pets; and before and after eating or preparing food. If hands are visibly dirty use soap and water. If soap and water are not available, use an alcohol-based hand  with at least 60% alcohol. Avoid touching your eyes, nose, and mouth with unwashed hands. Cover your coughs and sneezes with a tissue. Throw used tissues in a lined trash can. Immediately wash your hands.    ACTIVE/FACILITATED SELF-MONITORING: Follow instructions provided by your local health department or health professionals, as appropriate. When working with your local health department check their available hours.    [facility]   Phone Number   [facility] [phone]   [facility] [place] [phone]   [place] Call 211   [place] [phone]     IF YOU HAVE CONFIRMED POSITIVE COVID-19:    Those who have completely recovered from COVID-19 may have immune-boosting antibodies in their plasma--called “convalescent plasma”--that could be  used to treat critically ill COVID19 patients.    [facility] is excited to begin working with [employer] on collecting convalescent plasma from  people who have recovered from COVID-19 as part of a program to treat patients infected with the virus. This FDA-approved “emergency investigational new drug” is a special blood product containing antibodies that may give patients an extra boost to fight the virus.    To be eligible to donate convalescent plasma, you must have a prior COVID-19 diagnosis documented by a laboratory test (or a positive test result for SARS-CoV-2 antibodies) and meet additional eligibility requirements.    If you are interested in donating convalescent plasma or have any additional questions, please contact the [facility] Convalescent Plasma  at [phone] or via e-mail at [email or web address].

## 2020-09-24 NOTE — TELEPHONE ENCOUNTER
Regarding: coughing, chest congestion, and wheezing.   ----- Message from Catherine Cronin sent at 9/24/2020  9:26 AM PDT -----  Patient called in to schedule appt with primary care. She is experiencing coughing, chest congestion, and wheezing. Needs to be cleared before making appt.

## 2020-09-25 ENCOUNTER — TELEPHONE (OUTPATIENT)
Dept: URGENT CARE | Facility: PHYSICIAN GROUP | Age: 65
End: 2020-09-25

## 2020-09-25 NOTE — TELEPHONE ENCOUNTER
Phone Number Called: [phone]    Call outcome: Spoke to patient regarding message below.    Message: called pt to advise her of her results. Pt did not answer, LVM to call back at [phone]

## 2020-09-28 ENCOUNTER — OFFICE VISIT (OUTPATIENT)
Dept: URGENT CARE | Facility: PHYSICIAN GROUP | Age: 65
End: 2020-09-28
Payer: MEDICARE

## 2020-09-28 VITALS
OXYGEN SATURATION: 94 % | HEART RATE: 92 BPM | WEIGHT: 283 LBS | HEIGHT: 65 IN | SYSTOLIC BLOOD PRESSURE: 170 MMHG | DIASTOLIC BLOOD PRESSURE: 86 MMHG | BODY MASS INDEX: 47.15 KG/M2 | RESPIRATION RATE: 18 BRPM | TEMPERATURE: 98.9 F

## 2020-09-28 DIAGNOSIS — J40 BRONCHITIS: ICD-10-CM

## 2020-09-28 PROCEDURE — 99214 OFFICE O/P EST MOD 30 MIN: CPT | Performed by: FAMILY MEDICINE

## 2020-09-28 RX ORDER — AZITHROMYCIN 250 MG/1
TABLET, FILM COATED ORAL
Qty: 6 TAB | Refills: 0 | Status: SHIPPED | OUTPATIENT
Start: 2020-09-28 | End: 2021-03-11

## 2020-09-28 ASSESSMENT — ENCOUNTER SYMPTOMS
VOMITING: 0
ABDOMINAL PAIN: 0
FEVER: 0
HEADACHES: 0
WHEEZING: 1
COUGH: 1
SHORTNESS OF BREATH: 1

## 2020-09-28 ASSESSMENT — FIBROSIS 4 INDEX: FIB4 SCORE: 1.12

## 2020-09-28 NOTE — PROGRESS NOTES
Subjective:     Poonam Alonzo is a 65 y.o. female who presents for Follow-Up (Pt states she was unable to fill her Rx from last visit, Pt states she is trying to get a new Rx. )    HPI  Pt presents for follow-up of a worsening problem  Pt with cough for the past 2 weeks   Was evaluated 4 days ago and had a negative covid-10 test   Continues to have cough which is actually worsening a little bit  Was unable to  medications that previous physician prescribed as they were not covered by her insurance  Not having any fevers  Feels she is wheezing more  Inhaler does help temporarily  Cough and shortness of breath or worse with exertion  X-ray at last visit showed some peribronchial thickening without infiltrate/pneumonia    Review of Systems   Constitutional: Negative for fever.   HENT: Positive for congestion.    Respiratory: Positive for cough, shortness of breath and wheezing.    Gastrointestinal: Negative for abdominal pain and vomiting.   Skin: Negative for rash.   Neurological: Negative for headaches.       PMH:  has a past medical history of Diabetes mellitus (HCC) (3/18/2015), [place] (hard of hearing), and Hypothyroid (6/26/2014). She also has no past medical history of Diabetes.  MEDS:   Current Outpatient Medications:   •  albuterol 108 (90 Base) MCG/ACT Aero Soln inhalation aerosol, Inhale 1-2 Puffs by mouth every four hours as needed for Shortness of Breath., Disp: 1 Each, Rfl: 1  •  Spacer/Aero-Holding Chambers Device, 1 Device by Does not apply route as needed., Disp: 1 Each, Rfl: 0  •  benzonatate (TESSALON) 100 MG Cap, Take 1 Cap by mouth 3 times a day as needed for Cough., Disp: 30 Cap, Rfl: 0  •  Dextromethorphan-guaiFENesin 5-100 MG/5ML Liquid, Take 10 mL by mouth every 6 hours as needed (cough) for up to 7 days., Disp: 237 mL, Rfl: 0  •  levothyroxine (SYNTHROID) 100 MCG Tab, TAKE ONE TABLET BY MOUTH EVERY MORNING ON AN EMPTY STOMACH., Disp: 30 Tab, Rfl: 5  •  metFORMIN (GLUCOPHAGE) 500 MG  "Tab, Take 1 Tab by mouth 2 times a day, with meals., Disp: 60 Tab, Rfl: 2  •  fluticasone (FLONASE) 50 MCG/ACT nasal spray, Spray 1 Spray in nose every day., Disp: 16 g, Rfl: 2  •  atorvastatin (LIPITOR) 10 MG Tab, Take 1 Tab by mouth every day., Disp: 30 Tab, Rfl: 2  •  Omega-3 Fatty Acids (FISH OIL) 1000 MG Cap capsule, Take 1,000 mg by mouth 3 times a day, with meals., Disp: , Rfl:   •  Multiple Vitamins-Minerals (HAIR SKIN AND NAILS FORMULA) Tab, Take  by mouth., Disp: , Rfl:   •  THYROID PO, Take  by mouth., Disp: , Rfl:   ALLERGIES:   Allergies   Allergen Reactions   • Codeine    • Sulfa Drugs      SURGHX:   Past Surgical History:   Procedure Laterality Date   • ABDOMINAL HYSTERECTOMY TOTAL  9/29/2014   • ABDOMINAL EXPLORATION      laparoscopic hysterectomy     SOCHX:  reports that she has never smoked. She has never used smokeless tobacco. She reports that she does not drink alcohol or use drugs.  FH: Family history was reviewed, not contributing to acute illness     Objective:   BP (!) 170/86   Pulse 92   Temp 37.2 °C (98.9 °F) (Temporal)   Resp 18   Ht 1.638 m (5' 4.5\")   Wt (!) 128.4 kg (283 lb)   SpO2 94%   BMI 47.83 kg/m²     Physical Exam  Constitutional:       General: She is not in acute distress.     Appearance: She is well-developed. She is not diaphoretic.   HENT:      Head: Normocephalic and atraumatic.      Right Ear: External ear normal.      Left Ear: External ear normal.      Nose: Nose normal.   Eyes:      General: No scleral icterus.        Right eye: No discharge.         Left eye: No discharge.      Conjunctiva/sclera: Conjunctivae normal.   Neck:      Musculoskeletal: Normal range of motion.      Trachea: No tracheal deviation.   Cardiovascular:      Rate and Rhythm: Normal rate and regular rhythm.   Pulmonary:      Effort: Pulmonary effort is normal. No respiratory distress.      Breath sounds: Normal breath sounds. No rales.      Comments: Few scattered wheezes throughout, no " focal rales  Skin:     General: Skin is warm and dry.      Findings: No rash.   Neurological:      Mental Status: She is alert and oriented to person, place, and time.   Psychiatric:         Mood and Affect: Mood normal.         Behavior: Behavior normal.         Thought Content: Thought content normal.         Judgment: Judgment normal.       Assessment/Plan:   Assessment    1. Bronchitis  - azithromycin (ZITHROMAX) 250 MG Tab; Take 2 tabs PO first day, then take 1 tab PO daily x 4 days  Dispense: 6 Tab; Refill: 0    Patient with cough for the past 2 weeks which is worsening a little.  X-ray 4 days ago showed no infiltrate/pneumonia and no signs of pneumonia on exam today.  Do have concern for bacterial component to her illness as she is worsening around the 2-week tung.  Will have patient  cough medication previous physician prescribed and also given a course of azithromycin.  Follow-up if having new fevers while on antibiotics or if symptoms not improving with antibiotics.

## 2021-03-11 ENCOUNTER — OFFICE VISIT (OUTPATIENT)
Dept: MEDICAL GROUP | Facility: CLINIC | Age: 66
End: 2021-03-11
Payer: MEDICARE

## 2021-03-11 VITALS
DIASTOLIC BLOOD PRESSURE: 78 MMHG | WEIGHT: 280 LBS | OXYGEN SATURATION: 93 % | TEMPERATURE: 97 F | BODY MASS INDEX: 46.65 KG/M2 | HEIGHT: 65 IN | HEART RATE: 73 BPM | SYSTOLIC BLOOD PRESSURE: 148 MMHG | RESPIRATION RATE: 16 BRPM

## 2021-03-11 DIAGNOSIS — Z00.00 BLOOD TESTS FOR ROUTINE GENERAL PHYSICAL EXAMINATION: ICD-10-CM

## 2021-03-11 DIAGNOSIS — E03.9 HYPOTHYROIDISM, UNSPECIFIED TYPE: ICD-10-CM

## 2021-03-11 DIAGNOSIS — H83.3X3 NOISE-INDUCED HEARING LOSS OF BOTH EARS: ICD-10-CM

## 2021-03-11 DIAGNOSIS — E03.9 ACQUIRED HYPOTHYROIDISM: ICD-10-CM

## 2021-03-11 DIAGNOSIS — E55.9 VITAMIN D DEFICIENCY: ICD-10-CM

## 2021-03-11 DIAGNOSIS — E11.65 TYPE 2 DIABETES MELLITUS WITH HYPERGLYCEMIA, WITHOUT LONG-TERM CURRENT USE OF INSULIN (HCC): ICD-10-CM

## 2021-03-11 DIAGNOSIS — E78.2 MIXED HYPERLIPIDEMIA: ICD-10-CM

## 2021-03-11 DIAGNOSIS — E66.01 CLASS 3 SEVERE OBESITY DUE TO EXCESS CALORIES WITH SERIOUS COMORBIDITY AND BODY MASS INDEX (BMI) OF 45.0 TO 49.9 IN ADULT (HCC): ICD-10-CM

## 2021-03-11 DIAGNOSIS — Z11.9 SCREENING EXAMINATION FOR INFECTIOUS DISEASE: ICD-10-CM

## 2021-03-11 PROCEDURE — 99214 OFFICE O/P EST MOD 30 MIN: CPT | Performed by: PHYSICIAN ASSISTANT

## 2021-03-11 RX ORDER — VITAMIN B COMPLEX
1000 TABLET ORAL DAILY
COMMUNITY

## 2021-03-11 RX ORDER — LEVOTHYROXINE SODIUM 0.1 MG/1
100 TABLET ORAL
Qty: 30 TABLET | Refills: 1 | Status: SHIPPED | OUTPATIENT
Start: 2021-03-11 | End: 2021-05-17

## 2021-03-11 RX ORDER — ATORVASTATIN CALCIUM 10 MG/1
10 TABLET, FILM COATED ORAL DAILY
Qty: 30 TABLET | Refills: 2 | Status: SHIPPED | OUTPATIENT
Start: 2021-03-11 | End: 2021-05-14

## 2021-03-11 RX ORDER — CHOLECALCIFEROL (VITAMIN D3) 125 MCG
500 CAPSULE ORAL DAILY
COMMUNITY

## 2021-03-11 ASSESSMENT — FIBROSIS 4 INDEX: FIB4 SCORE: 1.14

## 2021-03-11 ASSESSMENT — PATIENT HEALTH QUESTIONNAIRE - PHQ9
CLINICAL INTERPRETATION OF PHQ2 SCORE: 1
5. POOR APPETITE OR OVEREATING: 0 - NOT AT ALL
SUM OF ALL RESPONSES TO PHQ QUESTIONS 1-9: 2

## 2021-03-11 NOTE — PROGRESS NOTES
Chief Complaint   Patient presents with   • Establish Care   • Thyroid Problem   • Referral Needed       HISTORY OF PRESENT ILLNESS: Patient is a 66 y.o. female established patient who presents today to discuss the following issues:    [place] (hard of hearing)  Patient here today requesting a referral to audiology.  Patient sees Bea Lucio.  She has worn hearing aids in the past but her previous hearing aids are no longer functioning and she requires a new set.  Her audiologist is requiring a referral to see her for reordering a new set of hearing aids.  Referral has been placed.    Acquired hypothyroidism  This is a chronic condition for this patient.  The last time she saw her previous provider he increased her levothyroxine to 100 mcg daily due to her being symptomatic.  She was complaining of severe fatigue.  Since the increase in dose she states that she feels much better.  She knows that she is due for routine lab work to ensure that her levels are appropriate and that her medication levels are sufficient.  Labs have been placed today we will follow-up with her in 2 weeks with results at that time we will make a determination whether or not her levothyroxine needs to be adjusted.    Mixed hyperlipidemia  This is a chronic condition for this patient.  She was started on atorvastatin 10 mg daily 06/20 when she was diagnosed.  Patient states she does not ever remember taking the medication and is not currently on any medication for her cholesterol levels.  Patient is due for routine fasting labs so the labs have been ordered today.  We will follow-up with her in 2 weeks with results.  We have restarted her atorvastatin 10 mg.    Type 2 diabetes mellitus with hyperglycemia, without long-term current use of insulin (HCC)  Patient was diagnosed with type 2 diabetes 6/20.  Before that time she had been prediabetic for a few years.  Her last A1c done 06/20 was at 6.6%.  She was started on Metformin 500 mg  twice daily.  Patient states that she heard bad things about Metformin and refuses to take it.  When I explained that Metformin is safe to take for diabetes she stated that she does not care if it safe or not she is not taking it.  We will do repeat labs on her and check a A1c.  If she is still in the diabetic range with her blood sugars we will start her on a different medication for diabetes.  I have explained to her the importance of a healthy diet and exercise though she is not motivated to change much at this time.  We will continue to follow closely with her and encouraged her to manage her diabetes better.    Class 3 severe obesity with body mass index (BMI) of 45.0 to 49.9 in adult (HCC)  This is a chronic condition for this patient.  She has lost 7 pounds in the last 6 months.  Her weight in the office today is 280 pounds with a BMI of 47.32 kg/m².  I have strongly encouraged weight loss and healthy diet along with daily physical activity.  I have explained to her that following the diet to help with her diabetes will also help her with her weight and her cholesterol.  She is not overly motivated at this time to make changes but we will continue to talk about it with her at each visit.    Vitamin D deficiency  This is a chronic condition for this patient.  Her last vitamin D level done 4/15/2016 was low at 26.  At that time she was on supplementation and her vitamin D was upward trending from a low of 16.  She is currently taking 1000 units of vitamin D3 daily and has not had her vitamin D levels checked since 2016.  We will order a new vitamin D level and follow-up with her in 2 weeks.  At that time we will make the determination if she needs to be started back on supplementation.       Patient Active Problem List    Diagnosis Date Noted   • Seasonal allergic rhinitis 06/19/2020   • Abrasion of right forearm 06/19/2020   • Eye problem 05/17/2019   • Vitamin D deficiency 01/13/2016   • Rash 08/12/2015   •  Type 2 diabetes mellitus with hyperglycemia, without long-term current use of insulin ([facility]) 03/18/2015   • Class 3 severe obesity with body mass index (BMI) of 45.0 to 49.9 in adult ([facility]) 03/18/2015   • Acquired hypothyroidism 06/26/2014   • Mixed hyperlipidemia 06/26/2014   • [place] (hard of hearing)        Allergies:Codeine and Sulfa drugs    Current Outpatient Medications   Medication Sig Dispense Refill   • levothyroxine (SYNTHROID) 100 MCG Tab Take 1 tablet by mouth Every morning on an empty stomach. 30 tablet 1   • atorvastatin (LIPITOR) 10 MG Tab Take 1 tablet by mouth every day. 30 tablet 2   • Multiple Vitamins-Minerals (HAIR SKIN AND NAILS FORMULA) Tab Take 1 tablet by mouth every day.     • cyanocobalamin (VITAMIN B-12) 500 MCG Tab Take 500 mcg by mouth every day.     • magnesium chloride (MAG-64) 64 MG Tablet Delayed Response Take 64 mg by mouth every day.     • vitamin D (CHOLECALCIFEROL) 1000 Unit (25 mcg) Tab Take 1,000 Units by mouth every day.       No current facility-administered medications for this visit.       Hospital Outpatient Visit on 09/24/2020   Component Date Value Ref Range Status   • COVID Order Status 09/24/2020 Received   Final    Comment: The order for SARS CoV-2 testing has been received by the  Laboratory. This result is neither positive nor negative.  Final results of testing will report in 24-48 hours, separately.     • SARS-CoV-2 Source 09/24/2020 Nasal Swab   Final   • SARS-CoV-2 by PCR 09/24/2020 NotDetected   Final    Comment: [facility] providers: PLEASE REFER TO DE-ESCALATION AND RETESTING PROTOCOL  on [email or web address]  **The TaqPath COVID-19 SARS-CoV-2 test has been made available for use under  the Emergency Use Authorization (EUA) only.     ]    The 10-year ASCVD risk score ([place] WALSH Jr., et al., 2013) is: 21.1%    Values used to calculate the score:      Age: 66 years      Sex: Female      Is Non- : No      Diabetic: Yes      Tobacco smoker: No       Systolic Blood Pressure: 148 mmHg      Is BP treated: No      HDL Cholesterol: 31 mg/dL      Total Cholesterol: 230 mg/dL    Social History     Tobacco Use   • Smoking status: Never Smoker   • Smokeless tobacco: Never Used   Substance Use Topics   • Alcohol use: No     Alcohol/week: 0.0 oz   • Drug use: No       Family Status   Relation Name Status   • Mo  Alive   • Fa  Alive   History reviewed. No pertinent family history.    No LMP recorded. Patient has had a hysterectomy.    Health Maintenance Summary                Annual Wellness Visit Overdue 1955     HEPATITIS C SCREENING Overdue 1955     IMM HEP B VACCINE Overdue 2/20/1973     IMM DTaP/Tdap/Td Vaccine Overdue 2/20/1973     IMM ZOSTER VACCINES Overdue 2/20/2004     RETINAL SCREENING Overdue 12/26/2015      Done 12/26/2014 AMB REFERRAL FOR RETINAL SCREENING EXAM    DIABETES MONOFILAMENT / LE EXAM Overdue 8/12/2016      Done 8/12/2015 AMB DIABETIC MONOFILAMENT LOWER EXTREMITY EXAM     Patient has more history with this topic...    URINE ACR / MICROALBUMIN Overdue 9/9/2016      Done 9/9/2015 MICROALBUMIN CREAT RATIO URINE    BONE DENSITY Overdue 2/20/2019     IMM PNEUMOCOCCAL VACCINE: 65+ Years Overdue 2/20/2019     COLON CANCER SCREENING ANNUAL FIT Overdue 5/1/2020      Done 5/1/2019 OCCULT BLOOD FECES IMMUNOASSAY     Patient has more history with this topic...    IMM INFLUENZA Overdue 9/1/2020     MAMMOGRAM Overdue 10/25/2020      Done 10/25/2019 [id number]     Patient has more history with this topic...    A1C SCREENING Overdue 12/5/2020      Done 6/5/2020 HEMOGLOBIN A1C     Patient has more history with this topic...    FASTING LIPID PROFILE Next Due 6/5/2021      Done 6/5/2020 LIPID PROFILE     Patient has more history with this topic...    SERUM CREATININE Next Due 6/5/2021      Done 6/5/2020 COMP METABOLIC PANEL     Patient has more history with this topic...           Review of Systems:   Constitutional: Negative for fever, chills,  "weight change, fatigue, loss of appetite.  HNT: Negative for nosebleeds, congestion, odynophagia, sore throat or changes in taste.    Eyes: Negative for vision changes.   Ears: Patient significantly hard of hearing.  Negative for recent changes in hearing, pain or discharge.  Neck: Negative for pain, swelling, lumps or goiter.  Respiratory: Negative for cough, sputum production, shortness of breath and wheezing.    Cardiovascular: Negative for chest pain, palpitations, orthopnea and leg swelling.   Gastrointestinal: Negative for constipation, diarrhea, heartburn, dysphagia, nausea, vomiting or abdominal pain.   Genitourinary: Negative for dysuria, urgency and frequency.   Musculoskeletal: Negative for myalgias, joint pain, and back pain.  Skin: Negative for skin, hair or nail changes, rash, itching.   Neurological: Negative for dizziness, tingling, tremors, sensory change, gait/coordination changes, focal weakness and headaches.   Endo/Heme/Allergies: Does not bruise/bleed easily.   Psychiatric/Behavioral: Negative for depression, suicidal ideas and memory loss.  The patient is not nervous/anxious and does not have insomnia.        Exam:  /78 (BP Location: Right arm, Patient Position: Sitting, BP Cuff Size: Large adult)   Pulse 73   Temp 36.1 °C (97 °F) (Temporal)   Resp 16   Ht 1.638 m (5' 4.5\")   Wt (!) 127 kg (280 lb)   SpO2 93%  Body mass index is 47.32 kg/m².  General:  Well nourished, morbidly obese, well developed female. No apparent distress.  Eyes: EOM intact, PERRL, conjunctiva non-injected, sclera non-icteric.  Ears: Laura pinnae, external auditory canals, TM pearly gray with normal light reflex bilaterally.  Neck: Supple with no cervical lymphadenopathy, JVD, palpable thyroid nodules or carotid bruits.  Pulmonary: Clear to ausculation bilaterally. Normal effort. No rales, ronchi, or wheezing.  Cardiovascular: Regular rate and rhythm without murmur, rub or gallop.   Abdomen:  Soft, " non-distended, non-tender with normal bowel sounds. No CVA tenderness  Extremities: Full range of motion limited by body habitus. Warm and well perfused with no edema.  Skin: Intact with no obvious rashes or lesions.  Neuro: Cranial nerves I-XII intact.  Psych: Alert and oriented x 3.  Appropriately dressed. Mood and affect appropriate.      Assessment/Plan:  1. Noise-induced hearing loss of both ears  REFERRAL TO AUDIOLOGY   2. Vitamin D deficiency     3. Mixed hyperlipidemia  atorvastatin (LIPITOR) 10 MG Tab   4. Acquired hypothyroidism     5. Type 2 diabetes mellitus with hyperglycemia, without long-term current use of insulin ([facility])     6. Class 3 severe obesity due to excess calories with serious comorbidity and body mass index (BMI) of 45.0 to 49.9 in adult ([facility])     7. Hypothyroidism, unspecified type  levothyroxine (SYNTHROID) 100 MCG Tab       Reviewed risks and benefits of treatment plan. Patient verbally agrees to plan of care.     Return in about 2 weeks (around 3/25/2021) for f/u labs.    Please note that this dictation was created using voice recognition software. I have made every reasonable attempt to correct obvious errors, but I expect that there are errors of grammar and possibly content that I did not discover before finalizing the note.

## 2021-03-11 NOTE — ASSESSMENT & PLAN NOTE
Patient here today requesting a referral to audiology.  Patient sees Bea Lucio.  She has worn hearing aids in the past but her previous hearing aids are no longer functioning and she requires a new set.  Her audiologist is requiring a referral to see her for reordering a new set of hearing aids.  Referral has been placed.

## 2021-03-11 NOTE — ASSESSMENT & PLAN NOTE
This is a chronic condition for this patient.  She has lost 7 pounds in the last 6 months.  Her weight in the office today is 280 pounds with a BMI of 47.32 kg/m².  I have strongly encouraged weight loss and healthy diet along with daily physical activity.  I have explained to her that following the diet to help with her diabetes will also help her with her weight and her cholesterol.  She is not overly motivated at this time to make changes but we will continue to talk about it with her at each visit.

## 2021-03-11 NOTE — ASSESSMENT & PLAN NOTE
This is a chronic condition for this patient.  Her last vitamin D level done 4/15/2016 was low at 26.  At that time she was on supplementation and her vitamin D was upward trending from a low of 16.  She is currently taking 1000 units of vitamin D3 daily and has not had her vitamin D levels checked since 2016.  We will order a new vitamin D level and follow-up with her in 2 weeks.  At that time we will make the determination if she needs to be started back on supplementation.

## 2021-03-11 NOTE — ASSESSMENT & PLAN NOTE
Patient was diagnosed with type 2 diabetes 6/20.  Before that time she had been prediabetic for a few years.  Her last A1c done 06/20 was at 6.6%.  She was started on Metformin 500 mg twice daily.  Patient states that she heard bad things about Metformin and refuses to take it.  When I explained that Metformin is safe to take for diabetes she stated that she does not care if it safe or not she is not taking it.  We will do repeat labs on her and check a A1c.  If she is still in the diabetic range with her blood sugars we will start her on a different medication for diabetes.  I have explained to her the importance of a healthy diet and exercise though she is not motivated to change much at this time.  We will continue to follow closely with her and encouraged her to manage her diabetes better.

## 2021-03-11 NOTE — ASSESSMENT & PLAN NOTE
This is a chronic condition for this patient.  The last time she saw her previous provider he increased her levothyroxine to 100 mcg daily due to her being symptomatic.  She was complaining of severe fatigue.  Since the increase in dose she states that she feels much better.  She knows that she is due for routine lab work to ensure that her levels are appropriate and that her medication levels are sufficient.  Labs have been placed today we will follow-up with her in 2 weeks with results at that time we will make a determination whether or not her levothyroxine needs to be adjusted.

## 2021-03-11 NOTE — ASSESSMENT & PLAN NOTE
This is a chronic condition for this patient.  She was started on atorvastatin 10 mg daily 06/20 when she was diagnosed.  Patient states she does not ever remember taking the medication and is not currently on any medication for her cholesterol levels.  Patient is due for routine fasting labs so the labs have been ordered today.  We will follow-up with her in 2 weeks with results.  We have restarted her atorvastatin 10 mg.

## 2021-04-19 ENCOUNTER — NON-PROVIDER VISIT (OUTPATIENT)
Dept: MEDICAL GROUP | Facility: CLINIC | Age: 66
End: 2021-04-19
Payer: MEDICARE

## 2021-04-19 ENCOUNTER — HOSPITAL ENCOUNTER (OUTPATIENT)
Facility: MEDICAL CENTER | Age: 66
End: 2021-04-19
Attending: PHYSICIAN ASSISTANT
Payer: MEDICARE

## 2021-04-19 DIAGNOSIS — E78.2 MIXED HYPERLIPIDEMIA: ICD-10-CM

## 2021-04-19 DIAGNOSIS — E11.65 TYPE 2 DIABETES MELLITUS WITH HYPERGLYCEMIA, WITHOUT LONG-TERM CURRENT USE OF INSULIN (HCC): ICD-10-CM

## 2021-04-19 DIAGNOSIS — E55.9 VITAMIN D DEFICIENCY: ICD-10-CM

## 2021-04-19 DIAGNOSIS — Z01.89 ENCOUNTER FOR LABORATORY TEST: ICD-10-CM

## 2021-04-19 DIAGNOSIS — Z00.00 BLOOD TESTS FOR ROUTINE GENERAL PHYSICAL EXAMINATION: ICD-10-CM

## 2021-04-19 DIAGNOSIS — Z11.9 SCREENING EXAMINATION FOR INFECTIOUS DISEASE: ICD-10-CM

## 2021-04-19 DIAGNOSIS — E03.9 ACQUIRED HYPOTHYROIDISM: ICD-10-CM

## 2021-04-19 PROCEDURE — 83036 HEMOGLOBIN GLYCOSYLATED A1C: CPT | Mod: GA

## 2021-04-19 PROCEDURE — 82306 VITAMIN D 25 HYDROXY: CPT

## 2021-04-19 PROCEDURE — 84443 ASSAY THYROID STIM HORMONE: CPT

## 2021-04-19 PROCEDURE — G0472 HEP C SCREEN HIGH RISK/OTHER: HCPCS | Mod: GA

## 2021-04-19 PROCEDURE — 80061 LIPID PANEL: CPT

## 2021-04-19 PROCEDURE — 85025 COMPLETE CBC W/AUTO DIFF WBC: CPT

## 2021-04-19 PROCEDURE — 80053 COMPREHEN METABOLIC PANEL: CPT

## 2021-04-19 PROCEDURE — 36415 COLL VENOUS BLD VENIPUNCTURE: CPT | Performed by: PHYSICIAN ASSISTANT

## 2021-04-19 PROCEDURE — 84439 ASSAY OF FREE THYROXINE: CPT

## 2021-04-20 LAB
ALBUMIN SERPL BCP-MCNC: 3.7 G/DL (ref 3.2–4.9)
ALBUMIN/GLOB SERPL: 1 G/DL
ALP SERPL-CCNC: 92 U/L (ref 30–99)
ALT SERPL-CCNC: 24 U/L (ref 2–50)
ANION GAP SERPL CALC-SCNC: 9 MMOL/L (ref 7–16)
AST SERPL-CCNC: 26 U/L (ref 12–45)
BASOPHILS # BLD AUTO: 0.4 % (ref 0–1.8)
BASOPHILS # BLD: 0.03 K/UL (ref 0–0.12)
BILIRUB SERPL-MCNC: 0.4 MG/DL (ref 0.1–1.5)
BUN SERPL-MCNC: 17 MG/DL (ref 8–22)
CALCIUM SERPL-MCNC: 9.4 MG/DL (ref 8.5–10.5)
CHLORIDE SERPL-SCNC: 101 MMOL/L (ref 96–112)
CHOLEST SERPL-MCNC: 250 MG/DL (ref 100–199)
CO2 SERPL-SCNC: 27 MMOL/L (ref 20–33)
CREAT SERPL-MCNC: 0.86 MG/DL (ref 0.5–1.4)
EOSINOPHIL # BLD AUTO: 0.36 K/UL (ref 0–0.51)
EOSINOPHIL NFR BLD: 4.7 % (ref 0–6.9)
ERYTHROCYTE [DISTWIDTH] IN BLOOD BY AUTOMATED COUNT: 48.1 FL (ref 35.9–50)
EST. AVERAGE GLUCOSE BLD GHB EST-MCNC: 128 MG/DL
GLOBULIN SER CALC-MCNC: 3.7 G/DL (ref 1.9–3.5)
GLUCOSE SERPL-MCNC: 136 MG/DL (ref 65–99)
HBA1C MFR BLD: 6.1 % (ref 4–5.6)
HCT VFR BLD AUTO: 49.8 % (ref 37–47)
HCV AB SER QL: NORMAL
HDLC SERPL-MCNC: 34 MG/DL
HGB BLD-MCNC: 15.1 G/DL (ref 12–16)
IMM GRANULOCYTES # BLD AUTO: 0.04 K/UL (ref 0–0.11)
IMM GRANULOCYTES NFR BLD AUTO: 0.5 % (ref 0–0.9)
LDLC SERPL CALC-MCNC: 164 MG/DL
LYMPHOCYTES # BLD AUTO: 2.52 K/UL (ref 1–4.8)
LYMPHOCYTES NFR BLD: 33 % (ref 22–41)
MCH RBC QN AUTO: 27.1 PG (ref 27–33)
MCHC RBC AUTO-ENTMCNC: 30.3 G/DL (ref 33.6–35)
MCV RBC AUTO: 89.4 FL (ref 81.4–97.8)
MONOCYTES # BLD AUTO: 0.46 K/UL (ref 0–0.85)
MONOCYTES NFR BLD AUTO: 6 % (ref 0–13.4)
NEUTROPHILS # BLD AUTO: 4.22 K/UL (ref 2–7.15)
NEUTROPHILS NFR BLD: 55.4 % (ref 44–72)
NRBC # BLD AUTO: 0 K/UL
NRBC BLD-RTO: 0 /100 WBC
PLATELET # BLD AUTO: 177 K/UL (ref 164–446)
PMV BLD AUTO: 12.8 FL (ref 9–12.9)
POTASSIUM SERPL-SCNC: 4.2 MMOL/L (ref 3.6–5.5)
PROT SERPL-MCNC: 7.4 G/DL (ref 6–8.2)
RBC # BLD AUTO: 5.57 M/UL (ref 4.2–5.4)
SODIUM SERPL-SCNC: 137 MMOL/L (ref 135–145)
T4 FREE SERPL-MCNC: 1.29 NG/DL (ref 0.93–1.7)
TRIGL SERPL-MCNC: 260 MG/DL (ref 0–149)
TSH SERPL DL<=0.005 MIU/L-ACNC: 2.38 UIU/ML (ref 0.38–5.33)
WBC # BLD AUTO: 7.6 K/UL (ref 4.8–10.8)

## 2021-04-21 LAB — 25(OH)D3 SERPL-MCNC: 19 NG/ML (ref 30–80)

## 2021-05-14 ENCOUNTER — OFFICE VISIT (OUTPATIENT)
Dept: MEDICAL GROUP | Facility: CLINIC | Age: 66
End: 2021-05-14
Payer: MEDICARE

## 2021-05-14 VITALS
WEIGHT: 279 LBS | DIASTOLIC BLOOD PRESSURE: 78 MMHG | BODY MASS INDEX: 46.48 KG/M2 | OXYGEN SATURATION: 74 % | RESPIRATION RATE: 14 BRPM | HEART RATE: 96 BPM | TEMPERATURE: 97.9 F | SYSTOLIC BLOOD PRESSURE: 120 MMHG | HEIGHT: 65 IN

## 2021-05-14 DIAGNOSIS — Z12.31 ENCOUNTER FOR SCREENING MAMMOGRAM FOR BREAST CANCER: ICD-10-CM

## 2021-05-14 DIAGNOSIS — E78.2 MIXED HYPERLIPIDEMIA: ICD-10-CM

## 2021-05-14 DIAGNOSIS — Z00.00 HEALTHCARE MAINTENANCE: ICD-10-CM

## 2021-05-14 DIAGNOSIS — E55.9 VITAMIN D DEFICIENCY: ICD-10-CM

## 2021-05-14 DIAGNOSIS — R73.03 PREDIABETES: ICD-10-CM

## 2021-05-14 DIAGNOSIS — Z12.12 SCREENING FOR COLORECTAL CANCER: ICD-10-CM

## 2021-05-14 DIAGNOSIS — E03.9 ACQUIRED HYPOTHYROIDISM: ICD-10-CM

## 2021-05-14 DIAGNOSIS — N95.1 MENOPAUSAL STATE: ICD-10-CM

## 2021-05-14 DIAGNOSIS — Z12.11 SCREENING FOR COLORECTAL CANCER: ICD-10-CM

## 2021-05-14 DIAGNOSIS — Z78.0 POSTMENOPAUSAL STATUS (AGE-RELATED) (NATURAL): ICD-10-CM

## 2021-05-14 PROCEDURE — 99214 OFFICE O/P EST MOD 30 MIN: CPT | Performed by: PHYSICIAN ASSISTANT

## 2021-05-14 ASSESSMENT — FIBROSIS 4 INDEX: FIB4 SCORE: 2.01

## 2021-05-14 NOTE — ASSESSMENT & PLAN NOTE
This is a chronic health problem that is uncontrolled with current lifestyle measures. She was previously on medications for this condition but discontinued taking them and refuses to take anything for her cholesterol. Patient has counseled on caloric and carbohydrate restriction along with increasing exercise to 30 minutes 5 or more times a week. She states she will try and change her diet but this is unlikely to occur. Discussed the risks of not controlling her cholesterol including heart attack and stroke. Explained that this is a higher risk for her due to her diabetes. She again stated that she was not going to take any medications for it and would try to control naturally. We will recheck lipids in 6 months and continue to have the discussion about control and medication management.     5/1/2019 6/5/2020 4/19/2021   Cholesterol,Tot 242 (H) 230 (H) 250 (H)   Triglycerides 230 (H) 294 (H) 260 (H)   HDL 33 (A) 31 (A) 34 (A)    (H) 140 (H) 164 (H)

## 2021-05-14 NOTE — ASSESSMENT & PLAN NOTE
Chronic condition moderately controlled on the current therapy with no new symptoms or worsening. Her level still low we will increase to 2000 units daily and recheck in 6 months.

## 2021-05-14 NOTE — PROGRESS NOTES
Chief Complaint   Patient presents with   • Lab Results       HISTORY OF PRESENT ILLNESS: Patient is a 66 y.o. female established patient who presents today to discuss the following issues:    Vitamin D deficiency  Chronic condition moderately controlled on the current therapy with no new symptoms or worsening. Her level still low we will increase to 2000 units daily and recheck in 6 months.    Mixed hyperlipidemia  This is a chronic health problem that is uncontrolled with current lifestyle measures. She was previously on medications for this condition but discontinued taking them and refuses to take anything for her cholesterol. Patient has counseled on caloric and carbohydrate restriction along with increasing exercise to 30 minutes 5 or more times a week. She states she will try and change her diet but this is unlikely to occur. Discussed the risks of not controlling her cholesterol including heart attack and stroke. Explained that this is a higher risk for her due to her diabetes. She again stated that she was not going to take any medications for it and would try to control naturally. We will recheck lipids in 6 months and continue to have the discussion about control and medication management.     5/1/2019 6/5/2020 4/19/2021   Cholesterol,Tot 242 (H) 230 (H) 250 (H)   Triglycerides 230 (H) 294 (H) 260 (H)   HDL 33 (A) 31 (A) 34 (A)    (H) 140 (H) 164 (H)       Acquired hypothyroidism  The patient's chronic condition is well controlled on the current therapy with no new symptoms or worsening. taking Levothyroxine 100 mcg daily. Will continue on this dose. Recheck labs in 6 months.    Prediabetes  Improving condition. Her A1c has dropped back into the prediabetic range. Currently 6.1% Due to the patient's issues with glucose management, today they were extensively counseled on a low carb diet, exercise and losing weight. We will recheck A1c in 6 months.    Healthcare maintenance  Patient is due for  mammogram, DEXA scan, FIT test and retinal exam. Appropriate orders have been placed today and we will follow up with results.      Patient Active Problem List    Diagnosis Date Noted   • Healthcare maintenance 05/16/2021   • Seasonal allergic rhinitis 06/19/2020   • Eye problem 05/17/2019   • Vitamin D deficiency 01/13/2016   • Rash 08/12/2015   • Prediabetes 03/18/2015   • Class 3 severe obesity with body mass index (BMI) of 45.0 to 49.9 in adult (HCC) 03/18/2015   • Acquired hypothyroidism 06/26/2014   • Mixed hyperlipidemia 06/26/2014   • [place] (hard of hearing)        Allergies:Codeine and Sulfa drugs    Current Outpatient Medications   Medication Sig Dispense Refill   • levothyroxine (SYNTHROID) 100 MCG Tab Take 1 tablet by mouth Every morning on an empty stomach. 30 tablet 1   • Multiple Vitamins-Minerals (HAIR SKIN AND NAILS FORMULA) Tab Take 1 tablet by mouth every day.     • cyanocobalamin (VITAMIN B-12) 500 MCG Tab Take 500 mcg by mouth every day.     • magnesium chloride (MAG-64) 64 MG Tablet Delayed Response Take 64 mg by mouth every day.     • vitamin D (CHOLECALCIFEROL) 1000 Unit (25 mcg) Tab Take 1,000 Units by mouth every day.       No current facility-administered medications for this visit.       Hospital Outpatient Visit on 04/19/2021   Component Date Value Ref Range Status   • GFR If  04/19/2021 >60  >60 mL/min/1.73 m 2 Final   • GFR If Non  04/19/2021 >60  >60 mL/min/1.73 m 2 Final   • Cholesterol,Tot 04/19/2021 250* 100 - 199 mg/dL Final   • Triglycerides 04/19/2021 260* 0 - 149 mg/dL Final   • HDL 04/19/2021 34* >=40 mg/dL Final   • LDL 04/19/2021 164* <100 mg/dL Final   • 25-Hydroxy   Vitamin D 25 04/19/2021 19* 30 - 80 ng/mL Final    Comment: INTERPRETIVE INFORMATION: Vitamin D, 25-Hydroxy  This assay accurately quantifies the sum of vitamin D3, 25-hydroxy  and vitamin D2, 25-hydroxy.  0-17 years:  Deficiency: less than 20 ng/mL  Optimum level: greater than or  equal to 20 ng/mL*  *(Toan SALDAÑA et al. Pediatrics 2008; 122: 1142-52.)  18 years and older:  Deficiency: Less than 20 ng/mL  Insufficiency: 20-29 ng/mL  Optimum Level: 30-80 ng/mL  Possible Toxicity: Greater than 150 ng/mL  Performed By: [facility]  [address] [place] [id number]  : Angélica Haskins MD     • TSH 04/19/2021 2.380  0.380 - 5.330 uIU/mL Final    Comment: Please note new reference ranges effective 12/14/2017 10:00 AM  Pregnant Females, 1st Trimester  0.050-3.700  Pregnant Females, 2nd Trimester  0.310-4.350  Pregnant Females, 3rd Trimester  0.410-5.180     • Free T-4 04/19/2021 1.29  0.93 - 1.70 ng/dL Final    Please note new FT4 reference range effective 4/29/2020.   • Hepatitis C Antibody 04/19/2021 Non-Reactive  Non-Reactive Final    Comment: Antibodies to HCV were not detected.  The Roche anti-HCV is a diagnostic test for the qualitative determination of  antibodies to the hepatitis C virus in human serum and plasma. The results  should be used and interpreted only in the context of the overall clinical  picture. A negative test result does not exclude the possibility of exposure  to hepatitis C virus.  Note: Assay performance characteristics have not been established in  populations of immunocompromised or immunosuppressed patients.     • Glycohemoglobin 04/19/2021 6.1* 4.0 - 5.6 % Final    Comment: Increased risk for diabetes:  5.7 -6.4%  Diabetes:  >6.4%  Glycemic control for adults with diabetes:  <7.0%  The above interpretations are per ADA guidelines.  Diagnosis  of diabetes mellitus on the basis of elevated Hemoglobin A1c  should be confirmed by repeating the Hb A1c test.     • Est Avg Glucose 04/19/2021 128  mg/dL Final    Comment: The eAG calculation is based on the A1c-Derived Daily Glucose  (ADAG) study.  See the ADA's website for additional information.     • WBC 04/19/2021 7.6  4.8 - 10.8 K/uL Final   • RBC 04/19/2021 5.57* 4.20 - 5.40 M/uL Final   •  Hemoglobin 04/19/2021 15.1  12.0 - 16.0 g/dL Final   • Hematocrit 04/19/2021 49.8* 37.0 - 47.0 % Final   • MCV 04/19/2021 89.4  81.4 - 97.8 fL Final   • MCH 04/19/2021 27.1  27.0 - 33.0 pg Final   • MCHC 04/19/2021 30.3* 33.6 - 35.0 g/dL Final   • RDW 04/19/2021 48.1  35.9 - 50.0 fL Final   • Platelet Count 04/19/2021 177  164 - 446 K/uL Final    Comment: Platelet clumping confirmed on smear review.  If a more accurate platelet  count is required, please request recollection.     • MPV 04/19/2021 12.8  9.0 - 12.9 fL Final   • Neutrophils-Polys 04/19/2021 55.40  44.00 - 72.00 % Final   • Lymphocytes 04/19/2021 33.00  22.00 - 41.00 % Final   • Monocytes 04/19/2021 6.00  0.00 - 13.40 % Final   • Eosinophils 04/19/2021 4.70  0.00 - 6.90 % Final   • Basophils 04/19/2021 0.40  0.00 - 1.80 % Final   • Immature Granulocytes 04/19/2021 0.50  0.00 - 0.90 % Final   • Nucleated RBC 04/19/2021 0.00  /100 WBC Final   • Neutrophils (Absolute) 04/19/2021 4.22  2.00 - 7.15 K/uL Final    Includes immature neutrophils, if present.   • Lymphs (Absolute) 04/19/2021 2.52  1.00 - 4.80 K/uL Final   • Monos (Absolute) 04/19/2021 0.46  0.00 - 0.85 K/uL Final   • Eos (Absolute) 04/19/2021 0.36  0.00 - 0.51 K/uL Final   • Baso (Absolute) 04/19/2021 0.03  0.00 - 0.12 K/uL Final   • Immature Granulocytes (abs) 04/19/2021 0.04  0.00 - 0.11 K/uL Final   • NRBC (Absolute) 04/19/2021 0.00  K/uL Final   • Sodium 04/19/2021 137  135 - 145 mmol/L Final   • Potassium 04/19/2021 4.2  3.6 - 5.5 mmol/L Final   • Chloride 04/19/2021 101  96 - 112 mmol/L Final   • Co2 04/19/2021 27  20 - 33 mmol/L Final   • Anion Gap 04/19/2021 9.0  7.0 - 16.0 Final   • Glucose 04/19/2021 136* 65 - 99 mg/dL Final   • Bun 04/19/2021 17  8 - 22 mg/dL Final   • Creatinine 04/19/2021 0.86  0.50 - 1.40 mg/dL Final   • Calcium 04/19/2021 9.4  8.5 - 10.5 mg/dL Final   • AST(SGOT) 04/19/2021 26  12 - 45 U/L Final   • ALT(SGPT) 04/19/2021 24  2 - 50 U/L Final   • Alkaline Phosphatase  04/19/2021 92  30 - 99 U/L Final   • Total Bilirubin 04/19/2021 0.4  0.1 - 1.5 mg/dL Final   • Albumin 04/19/2021 3.7  3.2 - 4.9 g/dL Final   • Total Protein 04/19/2021 7.4  6.0 - 8.2 g/dL Final   • Globulin 04/19/2021 3.7* 1.9 - 3.5 g/dL Final   • A-G Ratio 04/19/2021 1.0  g/dL Final   ]    The 10-year ASCVD risk score (Heidy WALSH Jr., et al., 2013) is: 14.7%    Values used to calculate the score:      Age: 66 years      Sex: Female      Is Non- : No      Diabetic: Yes      Tobacco smoker: No      Systolic Blood Pressure: 120 mmHg      Is BP treated: No      HDL Cholesterol: 34 mg/dL      Total Cholesterol: 250 mg/dL    Social History     Tobacco Use   • Smoking status: Never Smoker   • Smokeless tobacco: Never Used   Vaping Use   • Vaping Use: Never used   Substance Use Topics   • Alcohol use: No     Alcohol/week: 0.0 oz   • Drug use: No       Family Status   Relation Name Status   • Mo  Alive   • Fa  Alive   History reviewed. No pertinent family history.    No LMP recorded. Patient has had a hysterectomy.    Health Maintenance Summary                Annual Wellness Visit Overdue 1955     RETINAL SCREENING Overdue 12/26/2015      Done 12/26/2014 AMB REFERRAL FOR RETINAL SCREENING EXAM    DIABETES MONOFILAMENT / LE EXAM Overdue 8/12/2016      Done 8/12/2015 AMB DIABETIC MONOFILAMENT LOWER EXTREMITY EXAM     Patient has more history with this topic...    URINE ACR / MICROALBUMIN Overdue 9/9/2016      Done 9/9/2015 MICROALBUMIN CREAT RATIO URINE    BONE DENSITY Overdue 2/20/2019     COLON CANCER SCREENING ANNUAL FIT Overdue 5/1/2020      Done 5/1/2019 OCCULT BLOOD FECES IMMUNOASSAY     Patient has more history with this topic...    MAMMOGRAM Overdue 10/25/2020      Done 10/25/2019 [id number]     Patient has more history with this topic...    IMM ZOSTER VACCINES Postponed 10/14/2021 Originally 2/20/2004. Patient Refused    IMM DTaP/Tdap/Td Vaccine Postponed 5/14/2022 Originally  "2/20/1973. Patient Refused    IMM PNEUMOCOCCAL VACCINE: 65+ Years Postponed 5/14/2022 Originally 2/20/1960. Patient Refused    IMM INFLUENZA Next Due 9/1/2021     A1C SCREENING Next Due 10/19/2021      Done 4/19/2021 HEMOGLOBIN A1C     Patient has more history with this topic...    FASTING LIPID PROFILE Next Due 4/19/2022      Done 4/19/2021 LIPID PROFILE     Patient has more history with this topic...    SERUM CREATININE Next Due 4/19/2022      Done 4/19/2021 COMP METABOLIC PANEL     Patient has more history with this topic...           Review of Systems:   Constitutional: Negative for fever, chills, weight change, fatigue, loss of appetite.  HNT: Negative for nosebleeds, congestion, odynophagia, sore throat or changes in taste.    Eyes: Negative for vision changes.   Ears: Negative for recent changes in hearing, pain or discharge.  Neck: Negative for pain, swelling, lumps or goiter.  Respiratory: Negative for cough, sputum production, shortness of breath and wheezing.    Cardiovascular: Negative for chest pain, palpitations, orthopnea and leg swelling.   Gastrointestinal: Negative for constipation, diarrhea, heartburn, dysphagia, nausea, vomiting or abdominal pain.   Genitourinary: Negative for dysuria, urgency and frequency.   Musculoskeletal: Negative for myalgias, joint pain, and back pain.  Skin: Negative for skin, hair or nail changes, rash, itching.   Neurological: Negative for dizziness, tingling, tremors, sensory change, gait/coordination changes, focal weakness and headaches.   Endo/Heme/Allergies: Does not bruise/bleed easily.   Psychiatric/Behavioral: Negative for depression, suicidal ideas and memory loss.  The patient is not nervous/anxious and does not have insomnia.        Exam:  /78 (BP Location: Left arm, Patient Position: Sitting, BP Cuff Size: Adult long)   Pulse 96   Temp 36.6 °C (97.9 °F)   Resp 14   Ht 1.661 m (5' 5.4\")   Wt (!) 127 kg (279 lb)   SpO2 (!) 74%  Body mass index is " 45.86 kg/m².  General:  Well nourished, well developed female. Body mass index is 45.86 kg/m². No apparent distress.  Eyes: EOM intact, PERRL, conjunctiva non-injected, sclera non-icteric.  Neck: Supple with no cervical lymphadenopathy, JVD, palpable thyroid nodules or carotid bruits.  Pulmonary: Clear to ausculation bilaterally. Normal effort. No rales, ronchi, or wheezing.  Cardiovascular: Regular rate and rhythm without murmur, rub or gallop.   Extremities: Full range of motion limited by body habitus. Warm and well perfused with no edema.  Skin: Intact with no obvious rashes or lesions.  Neuro: Cranial nerves I-XII intact.  Psych: Alert and oriented x 3.  Appropriately dressed. Mood and affect appropriate.      Assessment/Plan:  1. Postmenopausal status (age-related) (natural)  DS-BONE DENSITY STUDY (DEXA)   2. Menopausal state  DS-BONE DENSITY STUDY (DEXA)   3. Screening for colorectal cancer  Occult Blood Feces Immunoassay (FIT)   4. Encounter for screening mammogram for breast cancer  MA-SCREENING MAMMO BILAT W/CAD   5. Vitamin D deficiency  VITAMIN D,25 HYDROXY   6. Mixed hyperlipidemia  Lipid Profile   7. Acquired hypothyroidism     8. Prediabetes  HEMOGLOBIN A1C   9. Healthcare maintenance         Reviewed risks and benefits of treatment plan. Patient verbally agrees to plan of care.     Return in about 6 months (around 11/14/2021) for f/u labs.    Please note that this dictation was created using voice recognition software. I have made every reasonable attempt to correct obvious errors, but I expect that there are errors of grammar and possibly content that I did not discover before finalizing the note.

## 2021-05-16 PROBLEM — Z00.00 HEALTHCARE MAINTENANCE: Status: ACTIVE | Noted: 2021-05-16

## 2021-05-16 PROBLEM — S50.811A ABRASION OF RIGHT FOREARM: Status: RESOLVED | Noted: 2020-06-19 | Resolved: 2021-05-16

## 2021-05-16 NOTE — ASSESSMENT & PLAN NOTE
The patient's chronic condition is well controlled on the current therapy with no new symptoms or worsening. taking Levothyroxine 100 mcg daily. Will continue on this dose. Recheck labs in 6 months.

## 2021-05-16 NOTE — ASSESSMENT & PLAN NOTE
Patient is due for mammogram, DEXA scan, FIT test and retinal exam. Appropriate orders have been placed today and we will follow up with results.

## 2021-05-16 NOTE — ASSESSMENT & PLAN NOTE
Improving condition. Her A1c has dropped back into the prediabetic range. Currently 6.1% Due to the patient's issues with glucose management, today they were extensively counseled on a low carb diet, exercise and losing weight. We will recheck A1c in 6 months.

## 2023-04-27 NOTE — ASSESSMENT & PLAN NOTE
Recent FLP:    Results for GERDA CASTELLANOS (MRN [id number]) as of 6/19/2020 11:07   Ref. Range 6/5/2020 20:07   Cholesterol,Tot Latest Ref Range: 100 - 199 mg/dL 230 (H)   Triglycerides Latest Ref Range: 0 - 149 mg/dL 294 (H)   HDL Latest Ref Range: >=40 mg/dL 31 (A)   LDL Latest Ref Range: <100 mg/dL 140 (H)        Was A Bandage Applied: Yes
